# Patient Record
Sex: MALE | Race: BLACK OR AFRICAN AMERICAN | Employment: FULL TIME | URBAN - METROPOLITAN AREA
[De-identification: names, ages, dates, MRNs, and addresses within clinical notes are randomized per-mention and may not be internally consistent; named-entity substitution may affect disease eponyms.]

---

## 2017-10-18 ENCOUNTER — ED HISTORICAL/CONVERTED ENCOUNTER (OUTPATIENT)
Dept: OTHER | Age: 27
End: 2017-10-18

## 2017-10-19 ENCOUNTER — OP HISTORICAL/CONVERTED ENCOUNTER (OUTPATIENT)
Dept: OTHER | Age: 27
End: 2017-10-19

## 2017-10-21 ENCOUNTER — HOSPITAL ENCOUNTER (INPATIENT)
Age: 27
LOS: 1 days | Discharge: HOME | DRG: 638 | End: 2017-10-22
Attending: EMERGENCY MEDICINE | Admitting: INTERNAL MEDICINE
Payer: COMMERCIAL

## 2017-10-21 DIAGNOSIS — E10.10 DIABETIC KETOACIDOSIS WITHOUT COMA ASSOCIATED WITH TYPE 1 DIABETES MELLITUS (HCC): Primary | ICD-10-CM

## 2017-10-21 DIAGNOSIS — L02.413 CUTANEOUS ABSCESS OF RIGHT UPPER EXTREMITY: ICD-10-CM

## 2017-10-21 PROBLEM — E11.10 DKA (DIABETIC KETOACIDOSES): Status: ACTIVE | Noted: 2017-10-21

## 2017-10-21 LAB
ADMINISTERED INITIALS, ADMINIT: NORMAL
ALBUMIN SERPL-MCNC: 4.1 G/DL (ref 3.5–5)
ALBUMIN/GLOB SERPL: 1 {RATIO} (ref 1.1–2.2)
ALP SERPL-CCNC: 158 U/L (ref 45–117)
ALT SERPL-CCNC: 45 U/L (ref 12–78)
ANION GAP SERPL CALC-SCNC: 20 MMOL/L (ref 5–15)
ANION GAP SERPL CALC-SCNC: 21 MMOL/L (ref 5–15)
APPEARANCE UR: CLEAR
ARTERIAL PATENCY WRIST A: ABNORMAL
AST SERPL-CCNC: 33 U/L (ref 15–37)
BACTERIA URNS QL MICRO: NEGATIVE /HPF
BASE DEFICIT BLDV-SCNC: 9 MMOL/L
BASOPHILS # BLD: 0 K/UL (ref 0–0.1)
BASOPHILS NFR BLD: 0 % (ref 0–1)
BDY SITE: ABNORMAL
BILIRUB SERPL-MCNC: 1.4 MG/DL (ref 0.2–1)
BILIRUB UR QL: NEGATIVE
BUN SERPL-MCNC: 20 MG/DL (ref 6–20)
BUN SERPL-MCNC: 21 MG/DL (ref 6–20)
BUN/CREAT SERPL: 15 (ref 12–20)
BUN/CREAT SERPL: 16 (ref 12–20)
CALCIUM SERPL-MCNC: 9.4 MG/DL (ref 8.5–10.1)
CALCIUM SERPL-MCNC: 9.5 MG/DL (ref 8.5–10.1)
CHLORIDE SERPL-SCNC: 88 MMOL/L (ref 97–108)
CHLORIDE SERPL-SCNC: 89 MMOL/L (ref 97–108)
CO2 SERPL-SCNC: 13 MMOL/L (ref 21–32)
CO2 SERPL-SCNC: 15 MMOL/L (ref 21–32)
COLOR UR: ABNORMAL
CREAT SERPL-MCNC: 1.31 MG/DL (ref 0.7–1.3)
CREAT SERPL-MCNC: 1.32 MG/DL (ref 0.7–1.3)
D50 ADMINISTERED, D50ADM: 0 ML
D50 ORDER, D50ORD: 0 ML
EOSINOPHIL # BLD: 0.1 K/UL (ref 0–0.4)
EOSINOPHIL NFR BLD: 1 % (ref 0–7)
EPITH CASTS URNS QL MICRO: ABNORMAL /LPF
ERYTHROCYTE [DISTWIDTH] IN BLOOD BY AUTOMATED COUNT: 11.9 % (ref 11.5–14.5)
EST. AVERAGE GLUCOSE BLD GHB EST-MCNC: 269 MG/DL
GAS FLOW.O2 O2 DELIVERY SYS: ABNORMAL L/MIN
GLOBULIN SER CALC-MCNC: 4.1 G/DL (ref 2–4)
GLSCOM COMMENTS: NORMAL
GLUCOSE BLD STRIP.AUTO-MCNC: 238 MG/DL (ref 65–100)
GLUCOSE BLD STRIP.AUTO-MCNC: 330 MG/DL (ref 65–100)
GLUCOSE BLD STRIP.AUTO-MCNC: 409 MG/DL (ref 65–100)
GLUCOSE SERPL-MCNC: 513 MG/DL (ref 65–100)
GLUCOSE SERPL-MCNC: 521 MG/DL (ref 65–100)
GLUCOSE UR STRIP.AUTO-MCNC: >1000 MG/DL
GLUCOSE, GLC: 238 MG/DL
GLUCOSE, GLC: 330 MG/DL
GLUCOSE, GLC: 409 MG/DL
HBA1C MFR BLD: 11 % (ref 4.2–6.3)
HCO3 BLDV-SCNC: 16.4 MMOL/L (ref 23–28)
HCT VFR BLD AUTO: 43.7 % (ref 36.6–50.3)
HGB BLD-MCNC: 15.3 G/DL (ref 12.1–17)
HGB UR QL STRIP: NEGATIVE
HIGH TARGET, HITG: 250 MG/DL
HYALINE CASTS URNS QL MICRO: ABNORMAL /LPF (ref 0–5)
INSULIN ADMINSTERED, INSADM: 5.3 UNITS/HOUR
INSULIN ADMINSTERED, INSADM: 7 UNITS/HOUR
INSULIN ADMINSTERED, INSADM: 8.1 UNITS/HOUR
INSULIN ORDER, INSORD: 5.3 UNITS/HOUR
INSULIN ORDER, INSORD: 7 UNITS/HOUR
INSULIN ORDER, INSORD: 8.1 UNITS/HOUR
KETONES UR QL STRIP.AUTO: 80 MG/DL
LEUKOCYTE ESTERASE UR QL STRIP.AUTO: NEGATIVE
LOW TARGET, LOT: 150 MG/DL
LYMPHOCYTES # BLD: 1.6 K/UL (ref 0.8–3.5)
LYMPHOCYTES NFR BLD: 17 % (ref 12–49)
MCH RBC QN AUTO: 33.3 PG (ref 26–34)
MCHC RBC AUTO-ENTMCNC: 35 G/DL (ref 30–36.5)
MCV RBC AUTO: 95 FL (ref 80–99)
MINUTES UNTIL NEXT BG, NBG: 60 MIN
MONOCYTES # BLD: 0.9 K/UL (ref 0–1)
MONOCYTES NFR BLD: 10 % (ref 5–13)
MULTIPLIER, MUL: 0.02
MULTIPLIER, MUL: 0.03
MULTIPLIER, MUL: 0.03
NEUTS SEG # BLD: 6.6 K/UL (ref 1.8–8)
NEUTS SEG NFR BLD: 72 % (ref 32–75)
NITRITE UR QL STRIP.AUTO: NEGATIVE
ORDER INITIALS, ORDINIT: NORMAL
PCO2 BLDV: 29.6 MMHG (ref 41–51)
PH BLDV: 7.35 [PH] (ref 7.32–7.42)
PH UR STRIP: 5 [PH] (ref 5–8)
PHOSPHATE SERPL-MCNC: 5.6 MG/DL (ref 2.6–4.7)
PLATELET # BLD AUTO: 327 K/UL (ref 150–400)
PO2 BLDV: 55 MMHG (ref 25–40)
POTASSIUM SERPL-SCNC: 5.4 MMOL/L (ref 3.5–5.1)
POTASSIUM SERPL-SCNC: 5.5 MMOL/L (ref 3.5–5.1)
POTASSIUM SERPL-SCNC: 6 MMOL/L (ref 3.5–5.1)
PROT SERPL-MCNC: 8.2 G/DL (ref 6.4–8.2)
PROT UR STRIP-MCNC: NEGATIVE MG/DL
RBC # BLD AUTO: 4.6 M/UL (ref 4.1–5.7)
RBC #/AREA URNS HPF: ABNORMAL /HPF (ref 0–5)
SAO2 % BLDV: 87 % (ref 65–88)
SERVICE CMNT-IMP: ABNORMAL
SODIUM SERPL-SCNC: 123 MMOL/L (ref 136–145)
SODIUM SERPL-SCNC: 123 MMOL/L (ref 136–145)
SP GR UR REFRACTOMETRY: 1.03 (ref 1–1.03)
SPECIMEN TYPE: ABNORMAL
UA: UC IF INDICATED,UAUC: ABNORMAL
UROBILINOGEN UR QL STRIP.AUTO: 0.2 EU/DL (ref 0.2–1)
WBC # BLD AUTO: 9.2 K/UL (ref 4.1–11.1)
WBC URNS QL MICRO: ABNORMAL /HPF (ref 0–4)

## 2017-10-21 PROCEDURE — 75810000289 HC I&D ABSCESS SIMP/COMP/MULT

## 2017-10-21 PROCEDURE — 87077 CULTURE AEROBIC IDENTIFY: CPT | Performed by: EMERGENCY MEDICINE

## 2017-10-21 PROCEDURE — 85025 COMPLETE CBC W/AUTO DIFF WBC: CPT | Performed by: EMERGENCY MEDICINE

## 2017-10-21 PROCEDURE — 82803 BLOOD GASES ANY COMBINATION: CPT

## 2017-10-21 PROCEDURE — 82962 GLUCOSE BLOOD TEST: CPT

## 2017-10-21 PROCEDURE — 0H9DXZZ DRAINAGE OF RIGHT LOWER ARM SKIN, EXTERNAL APPROACH: ICD-10-PCS | Performed by: EMERGENCY MEDICINE

## 2017-10-21 PROCEDURE — 87205 SMEAR GRAM STAIN: CPT | Performed by: EMERGENCY MEDICINE

## 2017-10-21 PROCEDURE — 96360 HYDRATION IV INFUSION INIT: CPT

## 2017-10-21 PROCEDURE — 74011000258 HC RX REV CODE- 258: Performed by: EMERGENCY MEDICINE

## 2017-10-21 PROCEDURE — 81001 URINALYSIS AUTO W/SCOPE: CPT | Performed by: INTERNAL MEDICINE

## 2017-10-21 PROCEDURE — 83036 HEMOGLOBIN GLYCOSYLATED A1C: CPT | Performed by: EMERGENCY MEDICINE

## 2017-10-21 PROCEDURE — 74011250636 HC RX REV CODE- 250/636: Performed by: INTERNAL MEDICINE

## 2017-10-21 PROCEDURE — 36415 COLL VENOUS BLD VENIPUNCTURE: CPT | Performed by: EMERGENCY MEDICINE

## 2017-10-21 PROCEDURE — 84132 ASSAY OF SERUM POTASSIUM: CPT | Performed by: EMERGENCY MEDICINE

## 2017-10-21 PROCEDURE — 74011250637 HC RX REV CODE- 250/637: Performed by: INTERNAL MEDICINE

## 2017-10-21 PROCEDURE — 80048 BASIC METABOLIC PNL TOTAL CA: CPT | Performed by: EMERGENCY MEDICINE

## 2017-10-21 PROCEDURE — 74011000250 HC RX REV CODE- 250: Performed by: EMERGENCY MEDICINE

## 2017-10-21 PROCEDURE — 65660000001 HC RM ICU INTERMED STEPDOWN

## 2017-10-21 PROCEDURE — 84100 ASSAY OF PHOSPHORUS: CPT | Performed by: EMERGENCY MEDICINE

## 2017-10-21 PROCEDURE — 74011250636 HC RX REV CODE- 250/636: Performed by: EMERGENCY MEDICINE

## 2017-10-21 PROCEDURE — 99285 EMERGENCY DEPT VISIT HI MDM: CPT

## 2017-10-21 PROCEDURE — 87186 SC STD MICRODIL/AGAR DIL: CPT | Performed by: EMERGENCY MEDICINE

## 2017-10-21 PROCEDURE — 80053 COMPREHEN METABOLIC PANEL: CPT | Performed by: EMERGENCY MEDICINE

## 2017-10-21 PROCEDURE — 74011636637 HC RX REV CODE- 636/637: Performed by: EMERGENCY MEDICINE

## 2017-10-21 RX ORDER — INSULIN LISPRO 100 [IU]/ML
INJECTION, SOLUTION INTRAVENOUS; SUBCUTANEOUS
Status: DISCONTINUED | OUTPATIENT
Start: 2017-10-22 | End: 2017-10-22 | Stop reason: HOSPADM

## 2017-10-21 RX ORDER — ONDANSETRON 2 MG/ML
4 INJECTION INTRAMUSCULAR; INTRAVENOUS
Status: DISCONTINUED | OUTPATIENT
Start: 2017-10-21 | End: 2017-10-22 | Stop reason: HOSPADM

## 2017-10-21 RX ORDER — SODIUM CHLORIDE 0.9 % (FLUSH) 0.9 %
5-10 SYRINGE (ML) INJECTION AS NEEDED
Status: DISCONTINUED | OUTPATIENT
Start: 2017-10-21 | End: 2017-10-21 | Stop reason: SDUPTHER

## 2017-10-21 RX ORDER — BACITRACIN 500 UNIT/G
1 PACKET (EA) TOPICAL
Status: COMPLETED | OUTPATIENT
Start: 2017-10-21 | End: 2017-10-21

## 2017-10-21 RX ORDER — SODIUM CHLORIDE 9 MG/ML
150 INJECTION, SOLUTION INTRAVENOUS CONTINUOUS
Status: DISCONTINUED | OUTPATIENT
Start: 2017-10-21 | End: 2017-10-21 | Stop reason: SDUPTHER

## 2017-10-21 RX ORDER — MAGNESIUM SULFATE 100 %
4 CRYSTALS MISCELLANEOUS AS NEEDED
Status: DISCONTINUED | OUTPATIENT
Start: 2017-10-21 | End: 2017-10-21 | Stop reason: SDUPTHER

## 2017-10-21 RX ORDER — MAGNESIUM SULFATE 100 %
4 CRYSTALS MISCELLANEOUS AS NEEDED
Status: DISCONTINUED | OUTPATIENT
Start: 2017-10-21 | End: 2017-10-22 | Stop reason: HOSPADM

## 2017-10-21 RX ORDER — DEXTROSE 50 % IN WATER (D50W) INTRAVENOUS SYRINGE
12.5-25 AS NEEDED
Status: DISCONTINUED | OUTPATIENT
Start: 2017-10-21 | End: 2017-10-22 | Stop reason: HOSPADM

## 2017-10-21 RX ORDER — SODIUM CHLORIDE 0.9 % (FLUSH) 0.9 %
5-10 SYRINGE (ML) INJECTION AS NEEDED
Status: DISCONTINUED | OUTPATIENT
Start: 2017-10-21 | End: 2017-10-22 | Stop reason: HOSPADM

## 2017-10-21 RX ORDER — SODIUM CHLORIDE 9 MG/ML
150 INJECTION, SOLUTION INTRAVENOUS CONTINUOUS
Status: DISCONTINUED | OUTPATIENT
Start: 2017-10-21 | End: 2017-10-22

## 2017-10-21 RX ORDER — ACETAMINOPHEN 325 MG/1
650 TABLET ORAL
Status: DISCONTINUED | OUTPATIENT
Start: 2017-10-21 | End: 2017-10-22 | Stop reason: HOSPADM

## 2017-10-21 RX ORDER — SODIUM CHLORIDE 0.9 % (FLUSH) 0.9 %
5-10 SYRINGE (ML) INJECTION EVERY 8 HOURS
Status: DISCONTINUED | OUTPATIENT
Start: 2017-10-21 | End: 2017-10-21 | Stop reason: SDUPTHER

## 2017-10-21 RX ORDER — LIDOCAINE HYDROCHLORIDE AND EPINEPHRINE 10; 10 MG/ML; UG/ML
1.5 INJECTION, SOLUTION INFILTRATION; PERINEURAL ONCE
Status: COMPLETED | OUTPATIENT
Start: 2017-10-21 | End: 2017-10-21

## 2017-10-21 RX ORDER — CEPHALEXIN 500 MG/1
500 CAPSULE ORAL EVERY 6 HOURS
Status: DISCONTINUED | OUTPATIENT
Start: 2017-10-22 | End: 2017-10-22 | Stop reason: HOSPADM

## 2017-10-21 RX ORDER — DEXTROSE 50 % IN WATER (D50W) INTRAVENOUS SYRINGE
12.5-25 AS NEEDED
Status: DISCONTINUED | OUTPATIENT
Start: 2017-10-21 | End: 2017-10-21 | Stop reason: SDUPTHER

## 2017-10-21 RX ORDER — SODIUM CHLORIDE 0.9 % (FLUSH) 0.9 %
5-10 SYRINGE (ML) INJECTION EVERY 8 HOURS
Status: DISCONTINUED | OUTPATIENT
Start: 2017-10-21 | End: 2017-10-22 | Stop reason: HOSPADM

## 2017-10-21 RX ADMIN — SODIUM CHLORIDE 150 ML/HR: 900 INJECTION, SOLUTION INTRAVENOUS at 22:57

## 2017-10-21 RX ADMIN — LIDOCAINE HYDROCHLORIDE,EPINEPHRINE BITARTRATE 15 MG: 10; .01 INJECTION, SOLUTION INFILTRATION; PERINEURAL at 19:09

## 2017-10-21 RX ADMIN — SODIUM CHLORIDE 150 ML/HR: 900 INJECTION, SOLUTION INTRAVENOUS at 22:00

## 2017-10-21 RX ADMIN — Medication 10 ML: at 23:00

## 2017-10-21 RX ADMIN — Medication 10 ML: at 22:56

## 2017-10-21 RX ADMIN — SODIUM CHLORIDE 7 UNITS/HR: 900 INJECTION, SOLUTION INTRAVENOUS at 21:34

## 2017-10-21 RX ADMIN — SODIUM CHLORIDE 1000 ML: 900 INJECTION, SOLUTION INTRAVENOUS at 19:47

## 2017-10-21 RX ADMIN — BACITRACIN 1 PACKET: 500 OINTMENT TOPICAL at 19:09

## 2017-10-21 RX ADMIN — CEPHALEXIN 500 MG: 500 CAPSULE ORAL at 23:00

## 2017-10-21 RX ADMIN — ACETAMINOPHEN 650 MG: 325 TABLET ORAL at 22:56

## 2017-10-21 NOTE — IP AVS SNAPSHOT
[address] [place] [address] 
[phone] Patient: Malena Doe 
MRN: [id number] :1990 Current Discharge Medication List  
  
START taking these medications Dose & Instructions Dispensing Information Comments Morning Noon Evening Bedtime  
 cephALEXin 500 mg capsule Commonly known as:  Sharri Miranda Your last dose was: Your next dose is:    
   
   
 Dose:  500 mg Take 1 Cap by mouth every six (6) hours for 10 days. Quantity:  40 Cap Refills:  0 CONTINUE these medications which have NOT CHANGED Dose & Instructions Dispensing Information Comments Morning Noon Evening Bedtime  
  insulin pump Misc Commonly known as:  PATIENT SUPPLIED Your last dose was: Your next dose is:    
   
   
 by SubCUTAneous route as needed. Refills:  0 Where to Get Your Medications Information on where to get these meds will be given to you by the nurse or doctor. ! Ask your nurse or doctor about these medications  
  cephALEXin 500 mg capsule

## 2017-10-21 NOTE — ED TRIAGE NOTES
Triage Note: Patient reports being treated for cellulitis to the right wrist. Patient recently admitted to [facility]. Hx of diabetes.

## 2017-10-21 NOTE — ED PROVIDER NOTES
HPI Comments: 28 y.o. male with past medical history significant for DM who presents to the ED with chief complaint of skin problem. Patient reports swelling to his right hand and wrist with onset ~6 days ago. Patient reports his right wrist swelling became worse since onset; reports his right hand swelling has improved since onset. Patient reports attempting to drain the swollen area with a needle; reports being able to drain \"some pus\" from the site. Patient reports being seen in a free-standing ER for his symptoms ~6 days ago; reports being diagnosed with cellulitis and being discharged home with antibiotics (keflex/bactrim). Patient reports \"two to three\" episodes of vomiting ~2 days ago. Patient reports being seen at [facility] for his vomiting at that time; reports being admitted to the hospital over night, being treated with IV antibiotics, and being discharged home \"yesterday. \" Patient reports his X-Ray of the right wrist during his visits was \"normal, no fractures or anything like that. \" Patient reports a history of DM1; reports having an insulin pump with Humalog. Patient reports his blood glucose levels have been running high, \"around 300-500,\" recently. Patient denies a history of insulin pump failure. Patient denies a history of any surgeries. Patient reports being left-handed. Patient denies any IV drug use. Patient denies loss of appetite, fever, and other joint swelling. There are no other acute medical concerns at this time. Old Chart Review: Patient has no prior visits on file. Social Hx: Works at [employer]. PCP: No primary care provider on file. Note written by Chucky Stallworth, as dictated by Debra Hameed DO 6:37 PM     The history is provided by the patient. Past Medical History:   Diagnosis Date    Diabetes [facility])        History reviewed. No pertinent surgical history. History reviewed. No pertinent family history.     Social History     Social History    Marital status: N/A     Spouse name: N/A    Number of children: N/A    Years of education: N/A     Occupational History    Not on file. Social History Main Topics    Smoking status: Current Some Day Smoker    Smokeless tobacco: Never Used    Alcohol use Yes      Comment: 2 x month    Drug use: No    Sexual activity: Not on file     Other Topics Concern    Not on file     Social History Narrative    No narrative on file         ALLERGIES: Review of patient's allergies indicates no known allergies. Review of Systems   Constitutional: Negative for appetite change and fever. Musculoskeletal: Positive for joint swelling. All other systems reviewed and are negative. Vitals:    10/21/17 1812   BP: 126/72   Pulse: 99   Resp: 16   Temp: 98.5 °F (36.9 °C)   SpO2: 100%   Weight: 67.8 kg (149 lb 6 oz)   Height: 5' 7\" (1.702 m)            Physical Exam      Constitutional: Pt is awake and alert. Pt appears well-developed and well-nourished. NAD. HENT:   Head: Normocephalic and atraumatic. Nose: Nose normal.   Mouth/Throat: Oropharynx is clear and moist. No oropharyngeal exudate. Eyes: Conjunctivae and extraocular motions are normal. Pupils are equal, round, and reactive to light. Right eye exhibits no discharge. Left eye exhibits no discharge. No scleral icterus. Neck: No tracheal deviation present. Supple neck. Cardiovascular: Normal rate, regular rhythm, normal heart sounds and intact distal pulses. Exam reveals no gallop and no friction rub. No murmur heard. Pulmonary/Chest: Effort normal and breath sounds normal.  Pt  has no wheezes. Pt  has no rales. Abdominal: Soft. Pt  exhibits no distension and no mass. No tenderness. Pt  has no rebound and no guarding. Musculoskeletal:  Pt  exhibits no edema and no tenderness. Ext: Normal ROM in all four extremities; not tender to palpation; distal pulses are normal, no edema. Neurological:  Pt is alert.   nonfocal neuro exam.  Skin: Area of mild swelling by the right wrist; may be an abscess, not hot to the touch; no adenopathy around the right elbow. Psychiatric:  Pt  has a normal mood and affect. Behavior is normal.   Note written by Chucky Sheldon, as dictated by Lemuel Balderas DO 6:46 PM    MDM  Number of Diagnoses or Management Options  Critical Care  Total time providing critical care: 30-74 minutes        35 minutes, exclusive of I&D. ED Course       I&D Abcess Complex  Date/Time: 10/21/2017 7:35 PM  Performed by: Oscar Padilla  Authorized by: Oscar Padilla     Consent:     Consent obtained:  Verbal    Consent given by:  Patient  Location:     Type:  Abscess    Size:  2-3 cm     Location:  Upper extremity    Upper extremity location:  Wrist    Wrist location:  R wrist  Pre-procedure details:     Skin preparation:  Betadine  Anesthesia (see MAR for exact dosages): Anesthesia method:  Local infiltration    Local anesthetic:  Lidocaine 1% WITH epi  Procedure type:     Complexity:  Complex  Procedure details:     Incision types:  Stab incision and single straight    Incision depth:  Subcutaneous    Scalpel blade:  11    Wound management:  Probed and deloculated    Drainage:  Bloody and purulent    Drainage amount: Moderate    Wound treatment:  Wound left open    Packing materials:  1/4 in gauze  Post-procedure details:     Patient tolerance of procedure: Tolerated well, no immediate complications        9:70 PM- - I&D is finished. BMP results are strange. He does not look that sick. CMP ordered. New blood. Venous pH is 7.35 as well. No vomiting here. No tachypnea/Kussmaul respirations. 8:29 PM - CMP noted. Mild DKA - looks well. Needs at least one night in hospital on an insulin drip to clear dka. Likely DC tomorrow. I think he had a pump malfunction - could be new insulin bottle (high glucoses since new set placed with this insulin), kinked tubing or pump malfunction intrinsically.   Probably the tubing or the new insulin bottle. CONSULT NOTE:  8:36 PM Linden Moser DO spoke with Dr. Jaki Joe DO, Consult for Hospitalist. Discussed available diagnostic tests and clinical findings. Provider is in agreement with care plans as outlined. Dr. Jaki Joe DO will see and admit the patient. Wound is improved  Drained small amount of pus and sent for cx. Abscess organized with abx over past week.     Labs reviewed

## 2017-10-21 NOTE — IP AVS SNAPSHOT
[address] 
[phone] Patient: Patricia France 
MRN: [id number] :1990 You are allergic to the following No active allergies Recent Documentation Height Weight BMI Smoking Status 1.702 m 67 kg 23.13 kg/m2 Current Some Day Smoker Unresulted Labs Order Current Status CULTURE, BLOOD, PAIRED In process CULTURE, WOUND W GRAM STAIN Preliminary result Emergency Contacts Name Discharge Info Relation Home Work Mobile [address] [facility] CAREGIVER [3] Mother [14] [phone] About your hospitalization You were admitted on:  2017 You last received care in the:  [facility] 4 IMCU 2 You were discharged on:  2017 Unit phone number:  [phone] Why you were hospitalized Your primary diagnosis was:  Not on File Your diagnoses also included:  Dka (Diabetic Ketoacidoses) (Hcc), Type 1 Diabetes Mellitus With Hyperglycemia (Hcc) Providers Seen During Your Hospitalizations Provider Role Specialty Primary office phone Roxanne Buenrostro DO Attending Provider Emergency Medicine [phone] Lizy Leigh DO Attending Provider Hospitalist [phone] Sergey Ayoub MD Attending Provider Internal Medicine [phone] Your Primary Care Physician (PCP) Primary Care Physician Office Phone Office Fax Abdoulaye Choco [phone] Follow-up Information Follow up With Details Comments Contact Info None  please call new PCP and get appt this week None [phone]) Patient stated that they have no PCP Donovan Louie MD Schedule an appointment as soon as possible for a visit in 1 week post DKA fu/ DM management [address] [place] [address] 72 [phone] Nallely Blanca MD   [address] [id number] [phone] Current Discharge Medication List  
  
START taking these medications Dose & Instructions Dispensing Information Comments Morning Noon Evening Bedtime  
 cephALEXin 500 mg capsule Commonly known as:  Otto Samano Your last dose was: Your next dose is:    
   
   
 Dose:  500 mg Take 1 Cap by mouth every six (6) hours for 10 days. Quantity:  40 Cap Refills:  0 CONTINUE these medications which have NOT CHANGED Dose & Instructions Dispensing Information Comments Morning Noon Evening Bedtime  
  insulin pump Misc Commonly known as:  PATIENT SUPPLIED Your last dose was: Your next dose is:    
   
   
 by SubCUTAneous route as needed. Refills:  0 Where to Get Your Medications Information on where to get these meds will be given to you by the nurse or doctor. ! Ask your nurse or doctor about these medications  
  cephALEXin 500 mg capsule Discharge Instructions Discharge Instructions PATIENT ID: Keyanna Costa 
MRN: [id number] YOB: 1990 DATE OF ADMISSION: 10/21/2017  6:14 PM   
DATE OF DISCHARGE: 10/22/2017 PRIMARY CARE PROVIDER: None DISCHARGING PHYSICIAN: Migdalia Helms NP To contact this individual call [phone] and ask the  to page. If unavailable ask to be transferred the Adult Hospitalist Department. DISCHARGE DIAGNOSES DKA, R wrist infection CONSULTATIONS: IP CONSULT TO HOSPITALIST 
 
PROCEDURES/SURGERIES: * No surgery found * PENDING TEST RESULTS:  
At the time of discharge the following test results are still pending: final wound culture FOLLOW UP APPOINTMENTS:  
Follow-up Information Follow up With Details Comments Contact Info  None  please call new PCP and get appt this week None [phone]) Patient stated that they have no PCP 
  
 Leigha Ramirez MD Schedule an appointment as soon as possible for a visit in 1 week post DKA fu/ DM management [address] [place] [address] 72 [phone] ADDITIONAL CARE RECOMMENDATIONS: Please call your PCP in AM and try to get in this week for follow up of wrist wound. You will need to see Dr. Dannielle Myers for pump adjustment as your Hgb A1C was 11. Finish Keflex, antibiotic in full. DIET: low carb, low sugar, avoid Sweet tea and pastas, breads ACTIVITY: resume WOUND CARE: change dressing daily on right wrist, wash with antibacterial soap EQUIPMENT needed: resume insulin pump DISCHARGE MEDICATIONS: 
 See Medication Reconciliation Form · It is important that you take the medication exactly as they are prescribed. · Keep your medication in the bottles provided by the pharmacist and keep a list of the medication names, dosages, and times to be taken in your wallet. · Do not take other medications without consulting your doctor. NOTIFY YOUR PHYSICIAN FOR ANY OF THE FOLLOWING:  
Fever over 101 degrees for 24 hours. Chest pain, shortness of breath, fever, chills, nausea, vomiting, diarrhea, change in mentation, falling, weakness, bleeding. Severe pain or pain not relieved by medications. Or, any other signs or symptoms that you may have questions about. DISPOSITION: 
  Home With: 
 OT  PT  HH  RN  
  
 SNF/Inpatient Rehab/LTAC  
x Independent/assisted living Hospice Other: CDMP Checked:  
Yes x Signed:  
Migdalia Helms NP 
10/22/2017 
10:00 AM 
 
Discharge Orders None [facility] Announcement We are excited to announce that we are making your provider's discharge notes available to you in [email or web address]. You will see these notes when they are completed and signed by the physician that discharged you from your recent hospital stay.   If you have any questions or concerns about any information you see in eBuilder, please call the Health Information Department where you were seen or reach out to your Primary Care Provider for more information about your plan of care. Introducing [facility]! [facility] introduces eBuilder patient portal. Now you can access parts of your medical record, email your doctor's office, and request medication refills online. 1. In your internet browser, go to [email or web address] 2. Click on the First Time User? Click Here link in the Sign In box. You will see the New Member Sign Up page. 3. Enter your eBuilder Access Code exactly as it appears below. You will not need to use this code after youve completed the sign-up process. If you do not sign up before the expiration date, you must request a new code. · eBuilder Access Code: [email or web address] Expires: 1/20/2018 10:36 AM 
 
4. Enter the last four digits of your Social Security Number (xxxx) and Date of Birth (mm/dd/yyyy) as indicated and click Submit. You will be taken to the next sign-up page. 5. Create a eBuilder ID. This will be your eBuilder login ID and cannot be changed, so think of one that is secure and easy to remember. 6. Create a eBuilder password. You can change your password at any time. 7. Enter your Password Reset Question and Answer. This can be used at a later time if you forget your password. 8. Enter your e-mail address. You will receive e-mail notification when new information is available in [address] 9. Click Sign Up. You can now view and download portions of your medical record. 10. Click the Download Summary menu link to download a portable copy of your medical information. If you have questions, please visit the Frequently Asked Questions section of the eBuilder website. Remember, eBuilder is NOT to be used for urgent needs. For medical emergencies, dial 911. Now available from your iPhone and Android! General Information Please provide this summary of care documentation to your next provider. Patient Signature:  ____________________________________________________________ Date:  ____________________________________________________________  
  
Miryam Dejesus Provider Signature:  ____________________________________________________________ Date:  ____________________________________________________________

## 2017-10-22 VITALS
TEMPERATURE: 98.6 F | SYSTOLIC BLOOD PRESSURE: 142 MMHG | HEIGHT: 67 IN | OXYGEN SATURATION: 100 % | WEIGHT: 147.71 LBS | BODY MASS INDEX: 23.18 KG/M2 | DIASTOLIC BLOOD PRESSURE: 72 MMHG | HEART RATE: 62 BPM | RESPIRATION RATE: 16 BRPM

## 2017-10-22 PROBLEM — E10.65 TYPE 1 DIABETES MELLITUS WITH HYPERGLYCEMIA (HCC): Status: ACTIVE | Noted: 2017-10-22

## 2017-10-22 LAB
ADMINISTERED INITIALS, ADMINIT: NORMAL
ANION GAP SERPL CALC-SCNC: 14 MMOL/L (ref 5–15)
ANION GAP SERPL CALC-SCNC: 9 MMOL/L (ref 5–15)
BASOPHILS # BLD: 0 K/UL (ref 0–0.1)
BASOPHILS # BLD: 0 K/UL (ref 0–0.1)
BASOPHILS NFR BLD: 0 % (ref 0–1)
BASOPHILS NFR BLD: 0 % (ref 0–1)
BUN SERPL-MCNC: 11 MG/DL (ref 6–20)
BUN SERPL-MCNC: 16 MG/DL (ref 6–20)
BUN/CREAT SERPL: 12 (ref 12–20)
BUN/CREAT SERPL: 15 (ref 12–20)
CALCIUM SERPL-MCNC: 8.5 MG/DL (ref 8.5–10.1)
CALCIUM SERPL-MCNC: 8.6 MG/DL (ref 8.5–10.1)
CHLORIDE SERPL-SCNC: 102 MMOL/L (ref 97–108)
CHLORIDE SERPL-SCNC: 98 MMOL/L (ref 97–108)
CO2 SERPL-SCNC: 18 MMOL/L (ref 21–32)
CO2 SERPL-SCNC: 22 MMOL/L (ref 21–32)
CREAT SERPL-MCNC: 0.93 MG/DL (ref 0.7–1.3)
CREAT SERPL-MCNC: 1.09 MG/DL (ref 0.7–1.3)
D50 ADMINISTERED, D50ADM: 0 ML
D50 ADMINISTERED, D50ADM: NORMAL ML
D50 ORDER, D50ORD: 0 ML
D50 ORDER, D50ORD: NORMAL ML
EOSINOPHIL # BLD: 0 K/UL (ref 0–0.4)
EOSINOPHIL # BLD: 0.1 K/UL (ref 0–0.4)
EOSINOPHIL NFR BLD: 0 % (ref 0–7)
EOSINOPHIL NFR BLD: 1 % (ref 0–7)
ERYTHROCYTE [DISTWIDTH] IN BLOOD BY AUTOMATED COUNT: 11.7 % (ref 11.5–14.5)
ERYTHROCYTE [DISTWIDTH] IN BLOOD BY AUTOMATED COUNT: 11.7 % (ref 11.5–14.5)
EST. AVERAGE GLUCOSE BLD GHB EST-MCNC: 269 MG/DL
GLSCOM COMMENTS: NORMAL
GLUCOSE BLD STRIP.AUTO-MCNC: 127 MG/DL (ref 65–100)
GLUCOSE BLD STRIP.AUTO-MCNC: 129 MG/DL (ref 65–100)
GLUCOSE BLD STRIP.AUTO-MCNC: 144 MG/DL (ref 65–100)
GLUCOSE BLD STRIP.AUTO-MCNC: 153 MG/DL (ref 65–100)
GLUCOSE BLD STRIP.AUTO-MCNC: 157 MG/DL (ref 65–100)
GLUCOSE BLD STRIP.AUTO-MCNC: 190 MG/DL (ref 65–100)
GLUCOSE BLD STRIP.AUTO-MCNC: 208 MG/DL (ref 65–100)
GLUCOSE BLD STRIP.AUTO-MCNC: 209 MG/DL (ref 65–100)
GLUCOSE BLD STRIP.AUTO-MCNC: 225 MG/DL (ref 65–100)
GLUCOSE BLD STRIP.AUTO-MCNC: 240 MG/DL (ref 65–100)
GLUCOSE BLD STRIP.AUTO-MCNC: 276 MG/DL (ref 65–100)
GLUCOSE BLD STRIP.AUTO-MCNC: 300 MG/DL (ref 65–100)
GLUCOSE SERPL-MCNC: 172 MG/DL (ref 65–100)
GLUCOSE SERPL-MCNC: 243 MG/DL (ref 65–100)
GLUCOSE, GLC: 127 MG/DL
GLUCOSE, GLC: 129 MG/DL
GLUCOSE, GLC: 144 MG/DL
GLUCOSE, GLC: 153 MG/DL
GLUCOSE, GLC: 157 MG/DL
GLUCOSE, GLC: 190 MG/DL
GLUCOSE, GLC: 209 MG/DL
GLUCOSE, GLC: 225 MG/DL
GLUCOSE, GLC: 240 MG/DL
GLUCOSE, GLC: 276 MG/DL
GLUCOSE, GLC: 300 MG/DL
GLUCOSE, GLC: NORMAL MG/DL
HBA1C MFR BLD: 11 % (ref 4.2–6.3)
HCT VFR BLD AUTO: 39.5 % (ref 36.6–50.3)
HCT VFR BLD AUTO: 40.4 % (ref 36.6–50.3)
HGB BLD-MCNC: 14 G/DL (ref 12.1–17)
HGB BLD-MCNC: 14.1 G/DL (ref 12.1–17)
HIGH TARGET, HITG: 250 MG/DL
HIGH TARGET, HITG: NORMAL MG/DL
INSULIN ADMINSTERED, INSADM: 1.3 UNITS/HOUR
INSULIN ADMINSTERED, INSADM: 1.9 UNITS/HOUR
INSULIN ADMINSTERED, INSADM: 1.9 UNITS/HOUR
INSULIN ADMINSTERED, INSADM: 12 UNITS/HOUR
INSULIN ADMINSTERED, INSADM: 2.1 UNITS/HOUR
INSULIN ADMINSTERED, INSADM: 3 UNITS/HOUR
INSULIN ADMINSTERED, INSADM: 3.4 UNITS/HOUR
INSULIN ADMINSTERED, INSADM: 6.5 UNITS/HOUR
INSULIN ADMINSTERED, INSADM: 8.3 UNITS/HOUR
INSULIN ADMINSTERED, INSADM: 8.6 UNITS/HOUR
INSULIN ADMINSTERED, INSADM: 9 UNITS/HOUR
INSULIN ADMINSTERED, INSADM: NORMAL UNITS/HOUR
INSULIN ORDER, INSORD: 1.3 UNITS/HOUR
INSULIN ORDER, INSORD: 1.9 UNITS/HOUR
INSULIN ORDER, INSORD: 1.9 UNITS/HOUR
INSULIN ORDER, INSORD: 12 UNITS/HOUR
INSULIN ORDER, INSORD: 2.1 UNITS/HOUR
INSULIN ORDER, INSORD: 3 UNITS/HOUR
INSULIN ORDER, INSORD: 3.4 UNITS/HOUR
INSULIN ORDER, INSORD: 6.5 UNITS/HOUR
INSULIN ORDER, INSORD: 8.3 UNITS/HOUR
INSULIN ORDER, INSORD: 8.6 UNITS/HOUR
INSULIN ORDER, INSORD: 9 UNITS/HOUR
INSULIN ORDER, INSORD: NORMAL UNITS/HOUR
LACTATE SERPL-SCNC: 1.6 MMOL/L (ref 0.4–2)
LOW TARGET, LOT: 150 MG/DL
LOW TARGET, LOT: NORMAL MG/DL
LYMPHOCYTES # BLD: 2.1 K/UL (ref 0.8–3.5)
LYMPHOCYTES # BLD: 3.4 K/UL (ref 0.8–3.5)
LYMPHOCYTES NFR BLD: 17 % (ref 12–49)
LYMPHOCYTES NFR BLD: 30 % (ref 12–49)
MAGNESIUM SERPL-MCNC: 1.8 MG/DL (ref 1.6–2.4)
MAGNESIUM SERPL-MCNC: 1.8 MG/DL (ref 1.6–2.4)
MCH RBC QN AUTO: 32.3 PG (ref 26–34)
MCH RBC QN AUTO: 32.6 PG (ref 26–34)
MCHC RBC AUTO-ENTMCNC: 34.7 G/DL (ref 30–36.5)
MCHC RBC AUTO-ENTMCNC: 35.7 G/DL (ref 30–36.5)
MCV RBC AUTO: 91.2 FL (ref 80–99)
MCV RBC AUTO: 93.1 FL (ref 80–99)
MINUTES UNTIL NEXT BG, NBG: 60 MIN
MINUTES UNTIL NEXT BG, NBG: NORMAL MIN
MONOCYTES # BLD: 0.7 K/UL (ref 0–1)
MONOCYTES # BLD: 1.2 K/UL (ref 0–1)
MONOCYTES NFR BLD: 10 % (ref 5–13)
MONOCYTES NFR BLD: 6 % (ref 5–13)
MULTIPLIER, MUL: 0.02
MULTIPLIER, MUL: 0.03
MULTIPLIER, MUL: 0.04
MULTIPLIER, MUL: 0.04
MULTIPLIER, MUL: 0.05
MULTIPLIER, MUL: NORMAL
NEUTS SEG # BLD: 6.7 K/UL (ref 1.8–8)
NEUTS SEG # BLD: 9.1 K/UL (ref 1.8–8)
NEUTS SEG NFR BLD: 59 % (ref 32–75)
NEUTS SEG NFR BLD: 77 % (ref 32–75)
ORDER INITIALS, ORDINIT: NORMAL
PHOSPHATE SERPL-MCNC: 3.6 MG/DL (ref 2.6–4.7)
PLATELET # BLD AUTO: 318 K/UL (ref 150–400)
PLATELET # BLD AUTO: 338 K/UL (ref 150–400)
POTASSIUM SERPL-SCNC: 3.6 MMOL/L (ref 3.5–5.1)
POTASSIUM SERPL-SCNC: 4.2 MMOL/L (ref 3.5–5.1)
RBC # BLD AUTO: 4.33 M/UL (ref 4.1–5.7)
RBC # BLD AUTO: 4.34 M/UL (ref 4.1–5.7)
SERVICE CMNT-IMP: ABNORMAL
SODIUM SERPL-SCNC: 130 MMOL/L (ref 136–145)
SODIUM SERPL-SCNC: 133 MMOL/L (ref 136–145)
WBC # BLD AUTO: 11.4 K/UL (ref 4.1–11.1)
WBC # BLD AUTO: 11.9 K/UL (ref 4.1–11.1)

## 2017-10-22 PROCEDURE — 36415 COLL VENOUS BLD VENIPUNCTURE: CPT | Performed by: INTERNAL MEDICINE

## 2017-10-22 PROCEDURE — 87040 BLOOD CULTURE FOR BACTERIA: CPT | Performed by: INTERNAL MEDICINE

## 2017-10-22 PROCEDURE — 83036 HEMOGLOBIN GLYCOSYLATED A1C: CPT | Performed by: INTERNAL MEDICINE

## 2017-10-22 PROCEDURE — 83605 ASSAY OF LACTIC ACID: CPT | Performed by: INTERNAL MEDICINE

## 2017-10-22 PROCEDURE — 84100 ASSAY OF PHOSPHORUS: CPT | Performed by: INTERNAL MEDICINE

## 2017-10-22 PROCEDURE — 80048 BASIC METABOLIC PNL TOTAL CA: CPT | Performed by: INTERNAL MEDICINE

## 2017-10-22 PROCEDURE — 82962 GLUCOSE BLOOD TEST: CPT

## 2017-10-22 PROCEDURE — 74011000258 HC RX REV CODE- 258: Performed by: INTERNAL MEDICINE

## 2017-10-22 PROCEDURE — 74011636637 HC RX REV CODE- 636/637: Performed by: NURSE PRACTITIONER

## 2017-10-22 PROCEDURE — 83735 ASSAY OF MAGNESIUM: CPT | Performed by: INTERNAL MEDICINE

## 2017-10-22 PROCEDURE — 74011636637 HC RX REV CODE- 636/637: Performed by: INTERNAL MEDICINE

## 2017-10-22 PROCEDURE — 74011250637 HC RX REV CODE- 250/637: Performed by: INTERNAL MEDICINE

## 2017-10-22 PROCEDURE — 85025 COMPLETE CBC W/AUTO DIFF WBC: CPT | Performed by: EMERGENCY MEDICINE

## 2017-10-22 RX ORDER — INSULIN GLARGINE 100 [IU]/ML
10 INJECTION, SOLUTION SUBCUTANEOUS ONCE
Status: COMPLETED | OUTPATIENT
Start: 2017-10-22 | End: 2017-10-22

## 2017-10-22 RX ORDER — DEXTROSE MONOHYDRATE AND SODIUM CHLORIDE 5; .9 G/100ML; G/100ML
150 INJECTION, SOLUTION INTRAVENOUS CONTINUOUS
Status: DISCONTINUED | OUTPATIENT
Start: 2017-10-22 | End: 2017-10-22

## 2017-10-22 RX ORDER — CEPHALEXIN 500 MG/1
500 CAPSULE ORAL EVERY 6 HOURS
Qty: 40 CAP | Refills: 0 | Status: SHIPPED | OUTPATIENT
Start: 2017-10-22 | End: 2017-11-01

## 2017-10-22 RX ORDER — CEPHALEXIN 500 MG/1
500 CAPSULE ORAL EVERY 6 HOURS
Qty: 40 CAP | Refills: 0 | Status: SHIPPED | OUTPATIENT
Start: 2017-10-22 | End: 2017-10-22

## 2017-10-22 RX ADMIN — DEXTROSE MONOHYDRATE AND SODIUM CHLORIDE 150 ML/HR: 5; .9 INJECTION, SOLUTION INTRAVENOUS at 08:11

## 2017-10-22 RX ADMIN — CEPHALEXIN 500 MG: 500 CAPSULE ORAL at 11:14

## 2017-10-22 RX ADMIN — INSULIN GLARGINE 10 UNITS: 100 INJECTION, SOLUTION SUBCUTANEOUS at 10:34

## 2017-10-22 RX ADMIN — CEPHALEXIN 500 MG: 500 CAPSULE ORAL at 06:13

## 2017-10-22 RX ADMIN — DEXTROSE MONOHYDRATE AND SODIUM CHLORIDE 150 ML/HR: 5; .9 INJECTION, SOLUTION INTRAVENOUS at 00:37

## 2017-10-22 RX ADMIN — Medication 10 ML: at 06:13

## 2017-10-22 RX ADMIN — Medication 10 ML: at 14:00

## 2017-10-22 NOTE — PROGRESS NOTES
Problem: DKA: Day 2  Goal: Diagnostic Test/Procedures  Outcome: Progressing Towards Goal  Patients chemistry WDL, will reassess as needed.

## 2017-10-22 NOTE — ROUTINE PROCESS
TRANSFER - OUT REPORT:    Verbal report given to Юлия Renae RN (name) on Mary Flood  being transferred to [facility] (unit) for routine progression of care       Report consisted of patients Situation, Background, Assessment and   Recommendations(SBAR). Information from the following report(s) SBAR, ED Summary, Intake/Output, MAR and Recent Results was reviewed with the receiving nurse. Lines:   Peripheral IV 10/21/17 Left Antecubital (Active)   Site Assessment Clean, dry, & intact 10/21/2017  6:46 PM   Phlebitis Assessment 0 10/21/2017  6:46 PM   Infiltration Assessment 0 10/21/2017  6:46 PM   Dressing Status Clean, dry, & intact 10/21/2017  6:46 PM   Dressing Type Transparent 10/21/2017  6:46 PM   Hub Color/Line Status Pink;Flushed;Patent 10/21/2017  6:46 PM   Action Taken Blood drawn 10/21/2017  6:46 PM       Peripheral IV 10/21/17 Left; Lower Forearm (Active)   Site Assessment Clean, dry, & intact 10/21/2017 10:06 PM   Phlebitis Assessment 0 10/21/2017 10:06 PM   Infiltration Assessment 0 10/21/2017 10:06 PM   Dressing Status Clean, dry, & intact 10/21/2017 10:06 PM   Dressing Type Transparent 10/21/2017 10:06 PM   Hub Color/Line Status Pink;Flushed;Patent 10/21/2017 10:06 PM   Action Taken Blood drawn 10/21/2017 10:06 PM        Opportunity for questions and clarification was provided. Patient transported with:   Registered Nurse     UPDATE: Pt assessment unchanged, VSS, waiting on transport to inpatient unit.      Visit Vitals    /67 (BP 1 Location: Right arm, BP Patient Position: At rest)    Pulse 95    Temp 98.1 °F (36.7 °C)    Resp 16    Ht 5' 7\" (1.702 m)    Wt 67.8 kg (149 lb 6 oz)    SpO2 98%    BMI 23.4 kg/m2

## 2017-10-22 NOTE — DISCHARGE INSTRUCTIONS
Discharge Instructions       PATIENT ID: Ariela Hernandez  MRN: [id number]   YOB: 1990    DATE OF ADMISSION: 10/21/2017  6:14 PM    DATE OF DISCHARGE: 10/22/2017    PRIMARY CARE PROVIDER: None       DISCHARGING PHYSICIAN: Sheldon Faustin NP    To contact this individual call [phone] and ask the  to page. If unavailable ask to be transferred the Adult Hospitalist Department. DISCHARGE DIAGNOSES DKA, R wrist infection    CONSULTATIONS: IP CONSULT TO HOSPITALIST    PROCEDURES/SURGERIES: * No surgery found *    PENDING TEST RESULTS:   At the time of discharge the following test results are still pending: final wound culture    FOLLOW UP APPOINTMENTS:   Follow-up Information     Follow up With Details Comments Contact Info    None  please call new PCP and get appt this week None ([phone]) Patient stated that they have no PCP      Feliciano Pendleton MD Schedule an appointment as soon as possible for a visit in 1 week post DKA fu/ DM management [address] [address]  [phone]             ADDITIONAL CARE RECOMMENDATIONS: Please call your PCP in AM and try to get in this week for follow up of wrist wound. You will need to see Dr. Mensah Form for pump adjustment as your Hgb A1C was 11. Finish Keflex, antibiotic in full. DIET: low carb, low sugar, avoid Sweet tea and pastas, breads    ACTIVITY: resume    WOUND CARE: change dressing daily on right wrist, wash with antibacterial soap    EQUIPMENT needed: resume insulin pump      DISCHARGE MEDICATIONS:   See Medication Reconciliation Form    · It is important that you take the medication exactly as they are prescribed. · Keep your medication in the bottles provided by the pharmacist and keep a list of the medication names, dosages, and times to be taken in your wallet. · Do not take other medications without consulting your doctor.        NOTIFY YOUR PHYSICIAN FOR ANY OF THE FOLLOWING:   Fever over 101 degrees for 24 hours.   Chest pain, shortness of breath, fever, chills, nausea, vomiting, diarrhea, change in mentation, falling, weakness, bleeding. Severe pain or pain not relieved by medications. Or, any other signs or symptoms that you may have questions about.       DISPOSITION:    Home With:   OT  PT  HH  RN       SNF/Inpatient Rehab/LTAC   x Independent/assisted living    Hospice    Other:     CDMP Checked:   Yes x       Signed:   Bernardo King NP  10/22/2017  10:00 AM

## 2017-10-22 NOTE — PROGRESS NOTES
1019  TRANSFER - IN REPORT:    Verbal report received from [facility], [facility] (name) on Robin Thompson  being received from ED (unit) for routine progression of care      Report consisted of patients Situation, Background, Assessment and   Recommendations(SBAR). Information from the following report(s) SBAR, Kardex, ED Summary, Intake/Output, MAR, Recent Results and Cardiac Rhythm NSR  was reviewed with the receiving nurse. Opportunity for questions and clarification was provided. Assessment completed upon patients arrival to unit and care assumed. [address] Primary Nurse Bc Ma and Magalie RN performed a dual skin assessment on this patient right wrist abscess incision and drainage site with gauze and Darling wrap. John score is 23.

## 2017-10-22 NOTE — PROGRESS NOTES
Hospitalist Progress Note            Daily Progress Note: 10/22/2017    I have seen and examined the patient. Case discussed with NP, please see her more detailed note   I agree with the overall treatment plan as documented. DKA now resolved, can come off insulin drip. SUDHA now resolved    Hyperkalemia now resolved    Hyponatremia now improved    Cellulitis wrist on keflex wound culture [facility], ? outpatient follow up    Discharge ?today        Hamzah Snow MD      Hospitalist, Internal Medicine            Blackberry number:  [phone]

## 2017-10-22 NOTE — H&P
[address] [facility] [address]   HISTORY AND PHYSICAL       Name:  Xavier Khoury   MR#:  [id number]   :  1990   Account #:  [de-identified]        Date of Adm:  10/21/2017       PRIMARY CARE PHYSICIAN: Unknown. The patient does not have a   PCP. CHIEF COMPLAINT: High blood sugar. HISTORY OF PRESENT ILLNESS: This is a 18-year-old [place]   American male who has had type 1 diabetes mellitus since the age of   14 and uses an insulin pump. He states that several days ago, he   noted that he had a wound on his right wrist. He noted that it was   increasing and appeared to have an abscess, so he popped this   abscess himself, releasing some pus. It continued to worsen. He went   to an urgent care and was given Bactrim and Keflex. He felt that this   made him sick as he was vomiting and his wound continued to worsen. He then went to a hospital on the [facility]. He was admitted on   Thursday and given IV antibiotics. He was then discharged Friday   morning and states that he was not given any antibiotics to go home   with and he was afraid to take the oral antibiotics that he had leftover   secondary to vomiting. He states he felt that they were too high of a   dose for him. He then noted that his blood sugar has been increasing   steadily over the last 24 hours and he came to the emergency room for   further evaluation. In the emergency room, he was found to be in DKA   and he has been started on an insulin drip in the ICU with IV fluids and   will be admitted for further evaluation and treatment. PAST MEDICAL HISTORY: Significant for type 1 diabetes mellitus. PAST SURGICAL HISTORY: He has had no surgeries. FAMILY HISTORY: His father has hypertension and grandmother who   had diabetes. SOCIAL HISTORY: He denies any illicit drug use, but he admits to   occasional alcohol and tobacco abuse. ALLERGIES: HE HAS NO KNOWN DRUG ALLERGIES.     MEDICATIONS: Include his insulin pump only. REVIEW OF SYSTEMS   HEENT: He denies having any headache, dizziness, lightheadedness,   change in vision, change in hearing, tinnitus, rhinorrhea or cough. RESPIRATORY: Denies any shortness of breath, dyspnea on exertion. CARDIOVASCULAR: Denies any chest pain or palpitations. GASTROINTESTINAL: Denies any nausea, vomiting, constipation,   diarrhea. GENITOURINARY: Denies any dysuria or hematuria. MUSCULOSKELETAL: Denies any muscle pain or joint aches. SKIN: Denies having any itching or rash. PSYCHIATRIC: Denies having any issues of anxiety or depression. NEUROLOGIC: Denies having any numbness or tingling. ENDOCRINE: He does admit to having increased thirst and hunger   and feeling overall fatigued and feeling dehydrated. PHYSICAL EXAMINATION   GENERAL: On exam, this is a pleasant 26-year-old [facility] American   male who is in no acute distress. He is alert and oriented x3 and does   appear to be very tired. HEENT: Normocephalic, atraumatic. PERRLA, EOMI. Hearing is   intact. Nares are patent pink. Tonsillar pillars. Dry mucous membranes. He is noted to have injected conjunctivae. His hearing is intact. NECK: Supple, no JVD, no thyromegaly, no carotid bruits. HEART: Regular rate and rhythm. No murmurs, rubs or gallops. LUNGS: Clear bilaterally. No wheezing, rhonchi, or rales. ABDOMEN: Soft, nontender, nondistended, positive bowel sounds. EXTREMITIES: Have no clubbing, cyanosis or edema. MUSCULOSKELETAL: He has equal strength bilateral upper and   lower extremities. SKIN: Warm and dry. There is no rash, lesions or excoriations noted. PSYCHIATRIC: He has a normal affect. NEUROLOGIC: Cranial nerves 2-12 are grossly intact. VITAL SIGNS: Temperature is 98.1, pulse is 95, respirations 16, blood   pressure 141/67, and he is 98% saturated on room air. LABORATORY DATA: White count of 9.2, H and H of 15.3 and 43.7,   platelets of 451.  Sodium 123, potassium 6, chloride 88, bicarbonate is   15, his anion gap is 20, glucose is 513, BUN of 20, creatinine 1.31. Wound culture was done and is pending. ASSESSMENT AND PLAN:   1. Diabetic ketoacidosis. He has been started on an insulin drip and we   will continue this, getting labs every 4 hours, adjusting for his   electrolyte abnormalities as needed and we will stop his insulin drip   when his anion gap is closed and convert him back to his insulin pump. Will also consult diabetic education for the insulin pump. The patient   appears that perhaps there was a problem with his pump and he would   like some information on how to check that the pump is functioning   properly. 2. Right wrist wound. He was placed on antibiotics as an outpatient   and IV antibiotics at an outside hospital. He reports that the wound   does look better. According to the ER physician, there was still some   pus coming out of the wound. We will continue him on oral antibiotics   with Keflex and await the wound cultures. Also, do daily dressing   changes. 3. Electrolyte abnormalities. This is secondary to the DKA and will be   addressed as needed. DISPOSITION: He will be admitted to inpatient with and expected stay   of 24-48 hours.         Gissel Velazquez DO      CC / CD   D:  10/21/2017   22:06   T:  10/22/2017   05:56   Job #:  [id number]

## 2017-10-22 NOTE — PROGRESS NOTES
[address] October 22, 2017       RE: Hazelton Naverus      To Whom It May Concern,    This is to certify that Hazelton Squibb may may return to work on 10/23/2017. Please feel free to contact my office if you have any questions or concerns. Thank you for your assistance in this matter.       Sincerely,  Damon Peraza NP  [facility]  [phone]

## 2017-10-22 NOTE — PROGRESS NOTES
Problem: DKA: Day 1  Goal: Activity/Safety  Outcome: Progressing Towards Goal  Patient able to ambulate from stretcher to bed without assistance. Patient now resting quietly in bed. Bed in low position, locked bed wheels. Call bell and personal items within reach. Hourly rounding performed. Instructed patient to call when needing assistance.  Patient demonstrates understanding

## 2017-10-22 NOTE — DISCHARGE SUMMARY
Discharge Summary       PATIENT ID: Ann-Marie Guajardo  MRN: [id number]   YOB: 1990    DATE OF ADMISSION: 10/21/2017  6:14 PM    DATE OF DISCHARGE: 10/22/2017  PRIMARY CARE PROVIDER: Marston Mcardle, MD       DISCHARGING PHYSICIAN: Ann Russo NP    To contact this individual call [phone] and ask the  to page. If unavailable ask to be transferred the Adult Hospitalist Department. CONSULTATIONS: IP CONSULT TO HOSPITALIST    PROCEDURES/SURGERIES: * No surgery found *    ADMITTING DIAGNOSES & HOSPITAL COURSE:   This is a 26-year-old [place] American male who has had type 1 diabetes mellitus since the age of   14 and uses an insulin pump. He states that several days ago, he   noted that he had a wound on his right wrist. He noted that it was   increasing and appeared to have an abscess, so he popped this   abscess himself, releasing some pus. It continued to worsen. He went   to an urgent care and was given Bactrim and Keflex. He felt that this   made him sick as he was vomiting and his wound continued to worsen. He then went to a hospital on the [facility]. He was admitted on   Thursday and given IV antibiotics. He was then discharged Friday   morning and states that he was not given any antibiotics to go home   with and he was afraid to take the oral antibiotics that he had leftover   secondary to vomiting. He states he felt that they were too high of a   dose for him. He then noted that his blood sugar has been increasing   steadily over the last 24 hours and he came to the emergency room for   further evaluation. In the emergency room, he was found to be in DKA   and he has been started on an insulin drip in the ICU with IV fluids and   will be admitted for further evaluation and treatment. Mr. Abhilash avendano was found to have Hgb A1C of 11.0. He sees Dr. Cordelia Rob for his DM and admits  To being noncompliant with his diet.  He has established a new PCP and will need sooner follow up then November. I discussed this with the patient and his girlfriend. He was transitioned off the Insulin drip with Lantus and was restarted on his Insulin pump. He was discharged in stable condition on Keflex. DISCHARGE DIAGNOSES / PLAN:      DKA  Hgb A1C 11.0  Non compliant with diet  Dr. Canary Bence, endocrinology follows  AG closed and BMP improved  Lantus 10 units, calculates to 13  Restart Insulin pump at noon    DM with Hyperglycemia and Complications  Follow up with PCP/Endocrine for better management    Right Wrist Wound  Repacked, Daily dressing changes  Keflex    Metabolic Acidosis  Resolved    Disposition: home with PCP fu and Endocrine       PENDING TEST RESULTS:   At the time of discharge the following test results are still pending: final wound culture    FOLLOW UP APPOINTMENTS:    Follow-up Information     Follow up With Details Comments Contact Info    None  please call new PCP and get appt this week None [phone] Patient stated that they have no PCP      Fredy Banks MD Schedule an appointment as soon as possible for a visit in 1 week post DKA fu/ DM management [address] [place] [address]  [phone]                   DISCHARGE MEDICATIONS:  Current Discharge Medication List      START taking these medications    Details   cephALEXin (KEFLEX) 500 mg capsule Take 1 Cap by mouth every six (6) hours for 10 days. Qty: 40 Cap, Refills: 0         CONTINUE these medications which have NOT CHANGED    Details    insulin pump (PATIENT SUPPLIED) misc by SubCUTAneous route as needed. NOTIFY YOUR PHYSICIAN FOR ANY OF THE FOLLOWING:   Fever over 101 degrees for 24 hours. Chest pain, shortness of breath, fever, chills, nausea, vomiting, diarrhea, change in mentation, falling, weakness, bleeding. Severe pain or pain not relieved by medications. Or, any other signs or symptoms that you may have questions about.     DISPOSITION:    Home With:   OT  PT  Merged with [facility]  RN Long term SNF/Inpatient Rehab   x Independent/assisted living    Hospice    Other:       PATIENT CONDITION AT DISCHARGE:     Functional status    Poor     Deconditioned    x Independent      Cognition   x  Lucid     Forgetful     Dementia      Catheters/lines (plus indication)    Looney     PICC     PEG    x None      Code status   x  Full code     DNR      PHYSICAL EXAMINATION AT DISCHARGE:  GENERAL: NAD, pleasant  HEENT: Normocephalic, atraumatic. NECK: Supple, no JVD, no thyromegaly, no carotid bruits. HEART: Regular rate and rhythm. No murmurs, rubs or gallops. LUNGS: Clear bilaterally. No wheezing, rhonchi, or rales. ABDOMEN: Soft, nontender, nondistended, positive bowel sounds. EXTREMITIES: Have no clubbing, cyanosis or edema. MUSCULOSKELETAL: He has equal strength bilateral upper and   lower extremities. SKIN: Warm and dry. There is no rash, lesions or excoriations noted. Right wrist with packing removed. No redness noted and unable to repack. Applied dressing and wrap. PSYCHIATRIC: He has a normal affect.    NEUROLOGIC: Alert and Oriented,   Visit Vitals    /68 (BP 1 Location: Right arm, BP Patient Position: At rest)    Pulse 69    Temp 98.3 °F (36.8 °C)    Resp 14    Ht 5' 7\" (1.702 m)    Wt 67 kg (147 lb 11.3 oz)    SpO2 99%    BMI 23.13 kg/m2           CHRONIC MEDICAL DIAGNOSES:  Problem List as of 10/22/2017  Date Reviewed: 10/22/2017          Codes Class Noted - Resolved    Type 1 diabetes mellitus with hyperglycemia ([facility] ICD-10-CM: E10.65  ICD-9-CM: 250.01  10/22/2017 - Present        DKA (diabetic ketoacidoses) ([facility] ICD-10-CM: E13.10  ICD-9-CM: 250.10  10/21/2017 - Present              Greater than 30 minutes were spent with the patient on counseling and coordination of care    Signed:   Sheba France NP  10/22/2017  9:46 AM

## 2017-10-23 LAB
BACTERIA SPEC CULT: ABNORMAL
GRAM STN SPEC: ABNORMAL
GRAM STN SPEC: ABNORMAL
SERVICE CMNT-IMP: ABNORMAL

## 2017-10-27 LAB
BACTERIA SPEC CULT: NORMAL
SERVICE CMNT-IMP: NORMAL

## 2020-09-17 ENCOUNTER — OFFICE VISIT (OUTPATIENT)
Dept: ENDOCRINOLOGY | Age: 30
End: 2020-09-17
Payer: COMMERCIAL

## 2020-09-17 VITALS
HEART RATE: 76 BPM | DIASTOLIC BLOOD PRESSURE: 69 MMHG | SYSTOLIC BLOOD PRESSURE: 107 MMHG | WEIGHT: 164 LBS | HEIGHT: 67 IN | TEMPERATURE: 97.5 F | BODY MASS INDEX: 25.74 KG/M2 | RESPIRATION RATE: 12 BRPM

## 2020-09-17 DIAGNOSIS — Z79.4 ENCOUNTER FOR LONG-TERM (CURRENT) USE OF INSULIN (HCC): ICD-10-CM

## 2020-09-17 DIAGNOSIS — E10.65 TYPE 1 DIABETES MELLITUS WITH HYPERGLYCEMIA (HCC): Primary | ICD-10-CM

## 2020-09-17 DIAGNOSIS — Z96.41 INSULIN PUMP STATUS: ICD-10-CM

## 2020-09-17 LAB — HBA1C MFR BLD HPLC: 8.4 %

## 2020-09-17 PROCEDURE — 99244 OFF/OP CNSLTJ NEW/EST MOD 40: CPT | Performed by: INTERNAL MEDICINE

## 2020-09-17 PROCEDURE — 83036 HEMOGLOBIN GLYCOSYLATED A1C: CPT | Performed by: INTERNAL MEDICINE

## 2020-09-17 RX ORDER — GLUCAGON INJECTION, SOLUTION 1 MG/.2ML
1 INJECTION, SOLUTION SUBCUTANEOUS AS NEEDED
Qty: 1 BOX | Refills: 0 | Status: SHIPPED | OUTPATIENT
Start: 2020-09-17 | End: 2020-09-23 | Stop reason: SDUPTHER

## 2020-09-17 RX ORDER — INSULIN LISPRO 100 [IU]/ML
INJECTION, SOLUTION INTRAVENOUS; SUBCUTANEOUS
Qty: 3 VIAL | Refills: 3
Start: 2020-09-17 | End: 2020-11-17 | Stop reason: SDUPTHER

## 2020-09-17 RX ORDER — INSULIN GLARGINE 100 [IU]/ML
INJECTION, SOLUTION SUBCUTANEOUS
Qty: 15 ML | Refills: 1 | Status: SHIPPED | OUTPATIENT
Start: 2020-09-17

## 2020-09-17 RX ORDER — INSULIN LISPRO 100 [IU]/ML
INJECTION, SOLUTION INTRAVENOUS; SUBCUTANEOUS
COMMUNITY
Start: 2020-08-07 | End: 2020-09-17 | Stop reason: SDUPTHER

## 2020-09-17 NOTE — PROGRESS NOTES
HISTORY OF PRESENT ILLNESS  Kwadwo Acosta is a 31 y.o. male. HPI  Patient here for initial visit of Type ONE  diabetes mellitus . Referred : by self/pcp    H/o diabetes for 15 years   TRANSFERRED CARE FROM DR. Matt Whittington     15 years of  Diabetes     Current A1C is 8.4 %  and symptoms/problems include none     Current diabetic medications include insulin pump, medtronic  Since age 22  . Current monitoring regimen: home blood tests - daily  Home blood sugar records: trend: fluctuating a lot  Any episodes of hypoglycemia? yes -     Weight trend: stable  Prior visit with dietician: no  Current diet: \"unhealthy\" diet in general  Current exercise: running/ jogging    Known diabetic complications: none  Cardiovascular risk factors: diabetes mellitus, male gender    Eye exam current (within one year): no  SEA: no     Past Medical History:   Diagnosis Date    [facility] ([facility]      History reviewed. No pertinent surgical history. Current Outpatient Medications   Medication Sig    HumaLOG U-100 Insulin 100 unit/mL injection USE UP  UNITS PER A DAY  CONTINUOUSLY VIA  INSULIN PUMP     insulin pump (PATIENT SUPPLIED) misc by SubCUTAneous route as needed. No current facility-administered medications for this visit. Review of Systems   Constitutional: Negative. HENT: Negative. Eyes: Negative. Respiratory: Negative. Cardiovascular: Negative. Gastrointestinal: Negative. Genitourinary: Negative. Musculoskeletal: Negative. Skin: Negative. Neurological: Negative. Endo/Heme/Allergies: Negative. Psychiatric/Behavioral: Negative. Physical Exam  Constitutional:       Appearance: He is well-developed. HENT:      Head: Normocephalic. Eyes:      Conjunctiva/sclera: Conjunctivae normal.      Pupils: Pupils are equal, round, and reactive to light. Neck:      Musculoskeletal: Normal range of motion and neck supple.    Cardiovascular:      Rate and Rhythm: Normal rate and regular rhythm. Pulmonary:      Effort: Pulmonary effort is normal.      Breath sounds: Normal breath sounds. Abdominal:      General: Bowel sounds are normal.      Palpations: Abdomen is soft. Musculoskeletal: Normal range of motion. Skin:     General: Skin is warm and dry. Neurological:      Mental Status: He is alert and oriented to person, place, and time. Deep Tendon Reflexes: Reflexes are normal and symmetric. Reviewed old labs   A1C  was in 9 to 10 % ranges     ASSESSMENT and PLAN    1. Type 1DM, uncontrolled , on insulin pump : a1c is over 8 %     He is on 530 G pump   Discussed the importance of glycemic control and what is stopping him from getting  To control     He has difficulty in carbing -    He does nto check sugars enough     Discussed Medtronic 670 G  And tandem   He wanted to stay with medtronic     Reviewed the glucose log     Strictly advised on using bolus wizard for every food item. Emphasized on avoiding high glycemic index foods and provided some examples. Patient is educated about the importance of glycemic control to prevent progression of complications. Patient is advised about checking blood sugars 6 times a day and maintaining log book. The danger of having low blood sugars has been explained with inappropriate use of insulin  Patient voiced understanding and using the printed instructions at home. specific diabetic recommendations: foot care discussed and Podiatry visits discussed, annual eye examinations at Ophthalmology discussed, glycohemoglobin and other lab monitoring discussed and labs immediately prior to next visit      2. Hypoglycemia :  Educated on treating the hypoglycemia. Discussed Glucagon use and prescribed    3.  HTN :. Patient is educated about importance of compliance with anti-hypertensives especially ARB/ACEI      5. use of aspirin to prevent MI and TIA's discussed      > 50 % of time is spent on counseling   Patient voiced understanding her plan of care

## 2020-09-17 NOTE — PATIENT INSTRUCTIONS
[address] [facility] Do not skip meals Do not eat in between meals Reduce carbs- pasta, rice, potatoes, bread Do not drink juices or sodas, even they are calorie zero or diet drinks Do not eat peanut butter Do not eat sugar free cookies and cakes Do not eat peaches, oranges, pineapples, raisins, grapes , canteloupe , honey dew and fruit medleys 
 
------------------------------------------------------------------------------------------------------------ 
 
humalog  -  90 units a day  ( 3 vials month )  
 
g-voke pen  
 
 
----------------------------------------------------------------------------------------- Back up regimen Check blood sugars immediately before each meal and at bedtime Take toujeo  Or lantus  insulin  30 units  at bed time Take  humalog fredmjev    insulin   12  Gm  : 1 units     units before  Meals Also, add additional humalog  as follows with meals  If blood sugars are[de-identified] 
 
150-200 mg 1 units 201-250 mg 2 units 251-300 mg 3 units 301-350 mg 4 units 351-400 mg 5 units 401-450 mg 6 units 451-500 mg 7 units Less than 70 mg NO INSULIN

## 2020-09-17 NOTE — LETTER
9/20/20 Patient: Power Hernandez  
YOB: 1990 Date of Visit: 9/17/2020 Juan Ellington MD 
[address] [facility] [address] [facility] [address] VIA Facsimile: [phone] Dear Juan Ellington MD, Thank you for referring Mr. Luis M Perez to [facility] for evaluation. My notes for this consultation are attached. If you have questions, please do not hesitate to call me. I look forward to following your patient along with you. Sincerely, Denise Hernández MD

## 2020-09-21 RX ORDER — GLUCAGON INJECTION, SOLUTION 1 MG/.2ML
1 INJECTION, SOLUTION SUBCUTANEOUS AS NEEDED
Qty: 1 ML | Refills: 0 | Status: SHIPPED | OUTPATIENT
Start: 2020-09-21

## 2020-09-23 RX ORDER — GLUCAGON INJECTION, SOLUTION 1 MG/.2ML
1 INJECTION, SOLUTION SUBCUTANEOUS AS NEEDED
Qty: 1 BOX | Refills: 0 | Status: SHIPPED | OUTPATIENT
Start: 2020-09-23

## 2020-10-31 ENCOUNTER — OFFICE VISIT (OUTPATIENT)
Dept: URGENT CARE | Age: 30
End: 2020-10-31
Payer: COMMERCIAL

## 2020-10-31 VITALS — RESPIRATION RATE: 16 BRPM | HEART RATE: 86 BPM | TEMPERATURE: 99 F | OXYGEN SATURATION: 97 %

## 2020-10-31 DIAGNOSIS — J06.9 URI, ACUTE: ICD-10-CM

## 2020-10-31 DIAGNOSIS — R05.9 COUGH: ICD-10-CM

## 2020-10-31 PROCEDURE — 99203 OFFICE O/P NEW LOW 30 MIN: CPT | Performed by: NURSE PRACTITIONER

## 2020-10-31 RX ORDER — BENZONATATE 200 MG/1
200 CAPSULE ORAL
Qty: 30 CAP | Refills: 0 | Status: SHIPPED | OUTPATIENT
Start: 2020-10-31 | End: 2022-01-05

## 2020-10-31 RX ORDER — ALBUTEROL SULFATE 90 UG/1
2 AEROSOL, METERED RESPIRATORY (INHALATION)
Qty: 1 INHALER | Refills: 0 | Status: SHIPPED | OUTPATIENT
Start: 2020-10-31

## 2020-10-31 NOTE — PROGRESS NOTES
OFFICE NOTE    Hugh No is a 31 y.o. male presenting today for the following:  Chief Complaint   Patient presents with    Cold Symptoms     runny nose cough since last week had neg covid 10/26          HPI     Patient presenting today for symptoms of a persistent cough. Patient states a week ago he was having a runny nose, sore throat and cough so went to [facility] and tested negative for COVID on 10/25/2020. Patient states he no longer have those symptoms but still have coughing. He states he tends to cough more when at work then at home. He works in a warehouse. Denies SOB or chest pain. Review of Systems   Constitutional: Negative for chills, fatigue and fever. HENT: Negative. Respiratory: Positive for cough. Negative for chest tightness, shortness of breath and wheezing. Cardiovascular: Negative for chest pain. Musculoskeletal: Negative. Neurological: Negative. History  Past Medical History:   Diagnosis Date    Diabetes [facility])        History reviewed. No pertinent surgical history.     Social History     Socioeconomic History    Marital status: SINGLE     Spouse name: Not on file    Number of children: Not on file    Years of education: Not on file    Highest education level: Not on file   Occupational History    Not on file   Social Needs    Financial resource strain: Not on file    Food insecurity     Worry: Not on file     Inability: Not on file    Transportation needs     Medical: Not on file     Non-medical: Not on file   Tobacco Use    Smoking status: Current Some Day Smoker    Smokeless tobacco: Never Used   Substance and Sexual Activity    Alcohol use: Yes     Comment: 2 x month    Drug use: No    Sexual activity: Not on file   Lifestyle    Physical activity     Days per week: Not on file     Minutes per session: Not on file    Stress: Not on file   Relationships    Social connections     Talks on phone: Not on file     Gets together: Not on file Attends Muslim service: Not on file     Active member of club or organization: Not on file     Attends meetings of clubs or organizations: Not on file     Relationship status: Not on file    Intimate partner violence     Fear of current or ex partner: Not on file     Emotionally abused: Not on file     Physically abused: Not on file     Forced sexual activity: Not on file   Other Topics Concern    Not on file   Social History Narrative    Not on file       No Known Allergies    Current Outpatient Medications   Medication Sig Dispense Refill    benzonatate (TESSALON) 200 mg capsule Take 1 Cap by mouth three (3) times daily as needed for Cough. 30 Cap 0    albuterol (PROVENTIL HFA, VENTOLIN HFA, PROAIR HFA) 90 mcg/actuation inhaler Take 2 Puffs by inhalation every four (4) hours as needed for Wheezing or Cough. 1 Inhaler 0    glucagon (Gvoke PFS 2-Pack Syringe) 1 mg/0.2 mL syrg 1 mg by SubCUTAneous route as needed (hypoglycemia). 1 Box 0    glucagon (Gvoke HypoPen 1-Pack) 1 mg/0.2 mL atIn 1 mg by SubCUTAneous route as needed (episode of hypoglycemia). 1 mL 0    HumaLOG U-100 Insulin 100 unit/mL injection USE UP TO 90 UNITS PER A DAY  CONTINUOUSLY VIA  INSULIN PUMP 3 Vial 3    insulin glargine (LANTUS,BASAGLAR) 100 unit/mL (3 mL) inpn Use to inject 30 units at bedtime, use if pump fails 15 mL 1     insulin pump (PATIENT SUPPLIED) misc by SubCUTAneous route as needed. Patient Care Team:  Patient Care Team:  Eunice Barrera MD as PCP - General (Family Medicine)      LABS:  No results found for any visits on 10/31/20. RADIOLOGY:  No recent results      Physical Exam  Constitutional:       General: He is not in acute distress. Appearance: Normal appearance. He is well-developed. He is not ill-appearing. HENT:      Nose: No congestion or rhinorrhea. Mouth/Throat:      Pharynx: No oropharyngeal exudate or posterior oropharyngeal erythema.    Cardiovascular:      Rate and Rhythm: Normal rate and regular rhythm. Heart sounds: Normal heart sounds. Pulmonary:      Effort: Pulmonary effort is normal.      Breath sounds: Normal breath sounds. No wheezing or rhonchi. Neurological:      Mental Status: He is alert and oriented to person, place, and time. Psychiatric:         Mood and Affect: Mood normal.           PHQ Screen:  No flowsheet data found. Vitals:    10/31/20 0824   Pulse: 86   Resp: 16   Temp: 99 °F (37.2 °C)   SpO2: 97%         Assessment and Plan    1. URI, acute  Advised patient to increase water intake. Take tylenol for discomfort. Use a humidifer if needed. Can use OTC cough and cold medication if needed. 2. Cough  - benzonatate (TESSALON) 200 mg capsule; Take 1 Cap by mouth three (3) times daily as needed for Cough. Dispense: 30 Cap; Refill: 0  - albuterol (PROVENTIL HFA, VENTOLIN HFA, PROAIR HFA) 90 mcg/actuation inhaler; Take 2 Puffs by inhalation every four (4) hours as needed for Wheezing or Cough. Dispense: 1 Inhaler; Refill: 0      MDM  Number of Diagnoses or Management Options  Cough:   URI, acute:       Procedures      *Plan of care reviewed with patient. Patient in agreement with plan and expresses understanding. All questions answered and patient encouraged to call or RTO if further questions or concerns. Advised patient if symptoms worsen to go to nearest ER or call 911. AVS and recommendations given to patient upon discharge.

## 2020-11-17 ENCOUNTER — OFFICE VISIT (OUTPATIENT)
Dept: ENDOCRINOLOGY | Age: 30
End: 2020-11-17
Payer: COMMERCIAL

## 2020-11-17 VITALS
RESPIRATION RATE: 18 BRPM | TEMPERATURE: 98.2 F | OXYGEN SATURATION: 99 % | BODY MASS INDEX: 25.74 KG/M2 | WEIGHT: 164 LBS | HEIGHT: 67 IN | HEART RATE: 85 BPM

## 2020-11-17 DIAGNOSIS — E10.65 TYPE 1 DIABETES MELLITUS WITH HYPERGLYCEMIA (HCC): Primary | ICD-10-CM

## 2020-11-17 DIAGNOSIS — Z79.4 ENCOUNTER FOR LONG-TERM (CURRENT) USE OF INSULIN (HCC): ICD-10-CM

## 2020-11-17 DIAGNOSIS — Z96.41 INSULIN PUMP STATUS: ICD-10-CM

## 2020-11-17 LAB
ALBUMIN SERPL-MCNC: 4.4 G/DL (ref 4–5)
ALBUMIN/CREAT UR: 30 MG/G CREAT (ref 0–29)
ALBUMIN/GLOB SERPL: 1.8 {RATIO} (ref 1.2–2.2)
ALP SERPL-CCNC: 92 IU/L (ref 39–117)
ALT SERPL-CCNC: 14 IU/L (ref 0–44)
AST SERPL-CCNC: 23 IU/L (ref 0–40)
BILIRUB SERPL-MCNC: 1 MG/DL (ref 0–1.2)
BUN SERPL-MCNC: 7 MG/DL (ref 6–20)
BUN/CREAT SERPL: 8 (ref 9–20)
C PEPTIDE SERPL-MCNC: <0.1 NG/ML (ref 1.1–4.4)
CALCIUM SERPL-MCNC: 9.5 MG/DL (ref 8.7–10.2)
CHLORIDE SERPL-SCNC: 104 MMOL/L (ref 96–106)
CHOLEST SERPL-MCNC: 129 MG/DL (ref 100–199)
CO2 SERPL-SCNC: 26 MMOL/L (ref 20–29)
CREAT SERPL-MCNC: 0.87 MG/DL (ref 0.76–1.27)
CREAT UR-MCNC: 238.5 MG/DL
EST. AVERAGE GLUCOSE BLD GHB EST-MCNC: 209 MG/DL
GAD65 AB SER IA-ACNC: <5 U/ML (ref 0–5)
GLOBULIN SER CALC-MCNC: 2.5 G/DL (ref 1.5–4.5)
GLUCOSE SERPL-MCNC: 65 MG/DL (ref 65–99)
HBA1C MFR BLD: 8.9 % (ref 4.8–5.6)
HDLC SERPL-MCNC: 46 MG/DL
INTERPRETATION, 910389: NORMAL
LDLC SERPL CALC-MCNC: 73 MG/DL (ref 0–99)
Lab: NORMAL
MICROALBUMIN UR-MCNC: 72.5 UG/ML
POTASSIUM SERPL-SCNC: 3.7 MMOL/L (ref 3.5–5.2)
PROT SERPL-MCNC: 6.9 G/DL (ref 6–8.5)
SODIUM SERPL-SCNC: 141 MMOL/L (ref 134–144)
TRIGL SERPL-MCNC: 41 MG/DL (ref 0–149)
VLDLC SERPL CALC-MCNC: 10 MG/DL (ref 5–40)

## 2020-11-17 PROCEDURE — 99214 OFFICE O/P EST MOD 30 MIN: CPT | Performed by: INTERNAL MEDICINE

## 2020-11-17 PROCEDURE — 3052F HG A1C>EQUAL 8.0%<EQUAL 9.0%: CPT | Performed by: INTERNAL MEDICINE

## 2020-11-17 RX ORDER — INSULIN LISPRO 100 [IU]/ML
INJECTION, SOLUTION INTRAVENOUS; SUBCUTANEOUS
Qty: 3 VIAL | Refills: 4
Start: 2020-11-17 | End: 2021-05-11 | Stop reason: SDUPTHER

## 2020-11-17 NOTE — PROGRESS NOTES
1. Have you been to the ER, urgent care clinic since your last visit?no  Hospitalized since your last visit? no    2. Have you seen or consulted any other health care providers outside of the [address] [place] [address] since your last visit? Include any pap smears or colon screening. no    Wt Readings from Last 3 Encounters:   11/17/20 164 lb (74.4 kg)   09/17/20 164 lb (74.4 kg)   10/22/17 147 lb 11.3 oz (67 kg)     Temp Readings from Last 3 Encounters:   11/17/20 98.2 °F (36.8 °C) (Oral)   10/31/20 99 °F (37.2 °C)   09/17/20 97.5 °F (36.4 °C) (Oral)     BP Readings from Last 3 Encounters:   09/17/20 107/69   10/22/17 142/72     Pulse Readings from Last 3 Encounters:   11/17/20 85   10/31/20 86   09/17/20 76     Lab Results   Component Value Date/Time    Hemoglobin A1c 11.0 (H) 10/22/2017 12:47 AM    Hemoglobin A1c (POC) 8.4 09/17/2020 10:38 AM

## 2020-11-17 NOTE — LETTER
11/18/20 Patient: Justus Moran  
YOB: 1990 Date of Visit: 11/17/2020 Renetta Ross MD 
[facility] [address] [facility] [address] [facility] [address] VIA Facsimile: [phone] Dear Renetta Ross MD, Thank you for referring Mr. Kassandra Rodriguez to [facility] for evaluation. My notes for this consultation are attached. If you have questions, please do not hesitate to call me. I look forward to following your patient along with you. Sincerely, Jeovany Morris MD

## 2020-11-17 NOTE — PATIENT INSTRUCTIONS
[address] [facility] Do not skip meals Do not eat in between meals Reduce carbs- pasta, rice, potatoes, bread Do not drink juices or sodas, even they are calorie zero or diet drinks Do not eat peanut butter Do not eat sugar free cookies and cakes Do not eat peaches, oranges, pineapples, raisins, grapes , canteloupe , honey dew and fruit medleys 
 
------------------------------------------------------------------------------------------------------------ 
 
humalog  -  90 units a day  ( 3 vials month )  
 
g-voke pen  
 
 
----------------------------------------------------------------------------------------- Back up regimen Check blood sugars immediately before each meal and at bedtime Take toujeo  Or lantus  insulin  30 units  at bed time Take  humalog fredmjev    insulin   12  Gm  : 1 units     units before  Meals Also, add additional humalog  as follows with meals  If blood sugars are[de-identified] 
 
150-200 mg 1 units 201-250 mg 2 units 251-300 mg 3 units 301-350 mg 4 units 351-400 mg 5 units 401-450 mg 6 units 451-500 mg 7 units Less than 70 mg NO INSULIN

## 2020-11-17 NOTE — PROGRESS NOTES
HISTORY OF PRESENT ILLNESS  Sergo Salas is a 31 y.o. male. HPI  Patient here for first follow up after  initial visit of Type ONE  diabetes mellitus   From sept 2020        On medtronic pump 550 G   He says newer version is not covered     He over indulged in food, requesting carb counting book         Old history :     Referred : by self/pcp    H/o diabetes for 15 years   TRANSFERRED CARE FROM DR. Garry Matute   15 years of  Diabetes   Current A1C is 8.4 %  and symptoms/problems include none   Current diabetic medications include insulin pump, medtronic  Since age 22   Current monitoring regimen: home blood tests - daily  Home blood sugar records: trend: fluctuating a lot  Any episodes of hypoglycemia? yes -     Weight trend: stable  Prior visit with dietician: no  Current diet: \"unhealthy\" diet in general  Current exercise: running/ jogging    Known diabetic complications: none  Cardiovascular risk factors: diabetes mellitus, male gender    Eye exam current (within one year): no  SEA: no     Past Medical History:   Diagnosis Date    [facility]      History reviewed. No pertinent surgical history. Current Outpatient Medications   Medication Sig    benzonatate (TESSALON) 200 mg capsule Take 1 Cap by mouth three (3) times daily as needed for Cough.  albuterol (PROVENTIL HFA, VENTOLIN HFA, PROAIR HFA) 90 mcg/actuation inhaler Take 2 Puffs by inhalation every four (4) hours as needed for Wheezing or Cough.  glucagon (Gvoke PFS 2-Pack Syringe) 1 mg/0.2 mL syrg 1 mg by SubCUTAneous route as needed (hypoglycemia).  glucagon (Gvoke HypoPen 1-Pack) 1 mg/0.2 mL atIn 1 mg by SubCUTAneous route as needed (episode of hypoglycemia).     HumaLOG U-100 Insulin 100 unit/mL injection USE UP TO 90 UNITS PER A DAY  CONTINUOUSLY VIA  INSULIN PUMP    insulin glargine (LANTUS,BASAGLAR) 100 unit/mL (3 mL) inpn Use to inject 30 units at bedtime, use if pump fails     insulin pump (PATIENT SUPPLIED) misc by SubCUTAneous route as needed. No current facility-administered medications for this visit. Review of Systems   Constitutional: Negative. HENT: Negative. Eyes: Negative. Respiratory: Negative. Cardiovascular: Negative. Gastrointestinal: Negative. Genitourinary: Negative. Musculoskeletal: Negative. Skin: Negative. Neurological: Negative. Endo/Heme/Allergies: Negative. Psychiatric/Behavioral: Negative. Physical Exam  Constitutional:       Appearance: He is well-developed. HENT:      Head: Normocephalic. Eyes:      Conjunctiva/sclera: Conjunctivae normal.      Pupils: Pupils are equal, round, and reactive to light. Neck:      Musculoskeletal: Normal range of motion and neck supple. Cardiovascular:      Rate and Rhythm: Normal rate and regular rhythm. Pulmonary:      Effort: Pulmonary effort is normal.      Breath sounds: Normal breath sounds. Abdominal:      General: Bowel sounds are normal.      Palpations: Abdomen is soft. Musculoskeletal: Normal range of motion. Skin:     General: Skin is warm and dry. Neurological:      Mental Status: He is alert and oriented to person, place, and time. Deep Tendon Reflexes: Reflexes are normal and symmetric. Reviewed old labs   A1C  was in 9 to 10 % ranges     ASSESSMENT and PLAN    1.  Type 1DM, uncontrolled , on insulin pump : a1c is over 8 %     Nov 2020   Pt is still on 65  G medtronic pump   He says he got a letter that said the upgrade is not covered   He did not carb at all   He does not log blood sugars on to pump   Again encouraged him to learn carbs and to document         Sept 2020     He is on 65 G medtronic pump   Discussed the importance of glycemic control and what is stopping him from getting  To control   He has difficulty in carbing -    He does nto check sugars enough   Discussed Medtronic 670 G  And tandem   He wanted to stay with medtronic   Reviewed the glucose log   Strictly advised on using bolus wizard for every food item. Emphasized on avoiding high glycemic index foods and provided some examples. Patient is educated about the importance of glycemic control to prevent progression of complications. Patient is advised about checking blood sugars 6 times a day and maintaining log book. 2. Hypoglycemia :  Educated on treating the hypoglycemia. Discussed Glucagon use and prescribed    3.  HTN :. Patient is educated about importance of compliance with anti-hypertensives especially ARB/ACEI      5. use of aspirin to prevent MI and TIA's discussed      > 50 % of time is spent on counseling   Patient voiced understanding her plan of care

## 2021-02-04 ENCOUNTER — TELEPHONE (OUTPATIENT)
Dept: ENDOCRINOLOGY | Age: 31
End: 2021-02-04

## 2021-02-04 RX ORDER — SYRINGE AND NEEDLE,INSULIN,1ML 25GX1"
SYRINGE, EMPTY DISPOSABLE MISCELLANEOUS
Qty: 100 SYRINGE | Refills: 6 | Status: SHIPPED | OUTPATIENT
Start: 2021-02-04 | End: 2021-05-11 | Stop reason: SDUPTHER

## 2021-02-04 NOTE — TELEPHONE ENCOUNTER
Patient calling to see if received paperwork from [facility] for pump supplies. Patient is out and needs supplies. Patient has Medtronic pump. Patient needs to get syringes sent to [facility] until his supplies comes in.

## 2021-02-04 NOTE — TELEPHONE ENCOUNTER
Spoke to [facility] [employer]. Rep states they do not need anything from our office at this time. Rep states they are waiting for a response from patient's insurance. Patient informed of above info.  And syringes sent in to local pharmacy per patient request.

## 2021-05-06 DIAGNOSIS — E10.10 DIABETIC KETOACIDOSIS WITHOUT COMA ASSOCIATED WITH TYPE 1 DIABETES MELLITUS (HCC): ICD-10-CM

## 2021-05-06 DIAGNOSIS — Z79.4 ENCOUNTER FOR LONG-TERM (CURRENT) USE OF INSULIN (HCC): ICD-10-CM

## 2021-05-06 DIAGNOSIS — E10.65 TYPE 1 DIABETES MELLITUS WITH HYPERGLYCEMIA (HCC): Primary | ICD-10-CM

## 2021-05-07 LAB
25(OH)D3+25(OH)D2 SERPL-MCNC: 14.4 NG/ML (ref 30–100)
ALBUMIN SERPL-MCNC: 4.5 G/DL (ref 4–5)
ALBUMIN/CREAT UR: 45 MG/G CREAT (ref 0–29)
ALBUMIN/GLOB SERPL: 2.4 {RATIO} (ref 1.2–2.2)
ALP SERPL-CCNC: 88 IU/L (ref 39–117)
ALT SERPL-CCNC: 16 IU/L (ref 0–44)
AST SERPL-CCNC: 21 IU/L (ref 0–40)
BILIRUB SERPL-MCNC: 0.9 MG/DL (ref 0–1.2)
BUN SERPL-MCNC: 8 MG/DL (ref 6–20)
BUN/CREAT SERPL: 9 (ref 9–20)
CALCIUM SERPL-MCNC: 9 MG/DL (ref 8.7–10.2)
CHLORIDE SERPL-SCNC: 103 MMOL/L (ref 96–106)
CHOLEST SERPL-MCNC: 122 MG/DL (ref 100–199)
CO2 SERPL-SCNC: 23 MMOL/L (ref 20–29)
CREAT SERPL-MCNC: 0.86 MG/DL (ref 0.76–1.27)
CREAT UR-MCNC: 290.1 MG/DL
EST. AVERAGE GLUCOSE BLD GHB EST-MCNC: 200 MG/DL
GLOBULIN SER CALC-MCNC: 1.9 G/DL (ref 1.5–4.5)
GLUCOSE SERPL-MCNC: 133 MG/DL (ref 65–99)
HBA1C MFR BLD: 8.6 % (ref 4.8–5.6)
HDLC SERPL-MCNC: 41 MG/DL
IMP & REVIEW OF LAB RESULTS: NORMAL
LDLC SERPL CALC-MCNC: 70 MG/DL (ref 0–99)
MICROALBUMIN UR-MCNC: 131 UG/ML
POTASSIUM SERPL-SCNC: 3.5 MMOL/L (ref 3.5–5.2)
PROT SERPL-MCNC: 6.4 G/DL (ref 6–8.5)
SODIUM SERPL-SCNC: 141 MMOL/L (ref 134–144)
SPECIMEN STATUS REPORT, ROLRST: NORMAL
TRIGL SERPL-MCNC: 44 MG/DL (ref 0–149)
VLDLC SERPL CALC-MCNC: 11 MG/DL (ref 5–40)

## 2021-05-11 ENCOUNTER — OFFICE VISIT (OUTPATIENT)
Dept: ENDOCRINOLOGY | Age: 31
End: 2021-05-11
Payer: COMMERCIAL

## 2021-05-11 VITALS
HEIGHT: 67 IN | SYSTOLIC BLOOD PRESSURE: 118 MMHG | HEART RATE: 81 BPM | BODY MASS INDEX: 26.59 KG/M2 | OXYGEN SATURATION: 99 % | TEMPERATURE: 96.5 F | DIASTOLIC BLOOD PRESSURE: 70 MMHG | RESPIRATION RATE: 18 BRPM | WEIGHT: 169.4 LBS

## 2021-05-11 DIAGNOSIS — E10.65 TYPE 1 DIABETES MELLITUS WITH HYPERGLYCEMIA (HCC): Primary | ICD-10-CM

## 2021-05-11 DIAGNOSIS — Z79.4 ENCOUNTER FOR LONG-TERM (CURRENT) USE OF INSULIN (HCC): ICD-10-CM

## 2021-05-11 DIAGNOSIS — Z96.41 INSULIN PUMP STATUS: ICD-10-CM

## 2021-05-11 PROCEDURE — 3052F HG A1C>EQUAL 8.0%<EQUAL 9.0%: CPT | Performed by: INTERNAL MEDICINE

## 2021-05-11 PROCEDURE — 99214 OFFICE O/P EST MOD 30 MIN: CPT | Performed by: INTERNAL MEDICINE

## 2021-05-11 RX ORDER — INSULIN LISPRO 100 [IU]/ML
INJECTION, SOLUTION INTRAVENOUS; SUBCUTANEOUS
Qty: 3 VIAL | Refills: 4
Start: 2021-05-11 | End: 2021-07-13

## 2021-05-11 RX ORDER — INSULIN LISPRO 100 [IU]/ML
30 INJECTION, SOLUTION INTRAVENOUS; SUBCUTANEOUS
COMMUNITY
End: 2022-07-13 | Stop reason: SDUPTHER

## 2021-05-11 RX ORDER — SYRINGE AND NEEDLE,INSULIN,1ML 25GX1"
SYRINGE, EMPTY DISPOSABLE MISCELLANEOUS
Qty: 100 SYRINGE | Refills: 6 | Status: SHIPPED | OUTPATIENT
Start: 2021-05-11 | End: 2022-09-15 | Stop reason: SDUPTHER

## 2021-05-11 RX ORDER — INSULIN LISPRO 100 [IU]/ML
30 INJECTION, SOLUTION INTRAVENOUS; SUBCUTANEOUS
Qty: 1 PACKAGE | Status: CANCELLED | OUTPATIENT
Start: 2021-05-11

## 2021-05-11 NOTE — PATIENT INSTRUCTIONS
SPECIFIC INSTRUCTIONS BELOW         DRINK water   Do not skip meals  Do not eat in between meals    Reduce carbs- pasta, rice, potatoes, bread   Do not drink juices or sodas, even they are calorie zero or diet drinks  Do not eat peanut butter     Do not eat sugar free cookies and cakes   Do not eat peaches, oranges, pineapples, raisins, grapes , canteloupe , honey dew and fruit medleys    ------------------------------------------------------------------------------------------------------------    humalog  -  90 units a day  ( 3 vials month ) medtronic   530 G       -----------------------------------------------------------------------------------------    Back up regimen       Check blood sugars immediately before each meal and at bedtime      Take toujeo  Or lantus  insulin  30 units  at bed time    Take  humalog lyeumjev    insulin   10  Gm  : 1 units     units before  Meals        Also, add additional humalog  as follows with meals  If blood sugars are[de-identified]    150-200 mg 1 units    201-250 mg 2 units    251-300 mg 3 units    301-350 mg 4 units    351-400 mg 5 units    401-450 mg 6 units    451-500 mg 7 units     Less than 70 mg NO INSULIN        PAY ATTENTION TO THESE GENERAL INSTRUCTIONS     -ANY tests other than blood work, which you opt to do  outside [facility] imaging facilities, you are responsible for prior authorizations if  required   - HEALTH MAINTENANCE IS NOT GOING TO BE UP TO DATE ON YOUR AVS- PLEASE IGNORE   - YOUR MED LIST IS NOT UP TO DATE AS SOME CHANGES ARE BEING MADE AFTER THE VISIT - FOLLOW SPECIFIC INSTRUCTIONS  ABOVE     Results     *Normal results will not be notified by a phone call starting January 1 2021   *If you have an upcoming visit, the results will be discussed at the visit   *Please sign up for MY CHART if you want access to your lab and test results  *Abnormal results which require immediate attention will be notified by phone call   *Abnormal results which do not require immediate assistance will be notified in 1-2 weeks       Refills    -    have your pharmacy send us a refill request  Phone calls  -  Allow  24 hrs.  for non-urgent calls to be returned  Prior authorization - It may take 2-4 weeks to process  Forms  -  FMLA, DMV etc., will take up to 2 weeks to process  Cancellations - please notify the office 2 days in advance   Samples  - will only be dispensed at visits     --------------------------------------------------------------------------------------------

## 2021-05-11 NOTE — PROGRESS NOTES
Room 3    Identified pt with two pt identifiers(name and ). Reviewed record in preparation for visit and have obtained necessary documentation. All patient medications has been reviewed. Chief Complaint   Patient presents with    Diabetes       3 most recent PHQ Screens 2021   Little interest or pleasure in doing things Not at all   Feeling down, depressed, irritable, or hopeless Not at all   Total Score PHQ 2 0     Abuse Screening Questionnaire 2021   Do you ever feel afraid of your partner? N   Are you in a relationship with someone who physically or mentally threatens you? N   Is it safe for you to go home? Y       Health Maintenance Review: Patient reminded of \"due or due soon\" health maintenance. I have asked the patient to contact his/her primary care provider (PCP) for follow-up on his/her health maintenance. Vitals:    21 1433   BP: 118/70   Pulse: 81   Resp: 18   Temp: (!) 96.5 °F (35.8 °C)   TempSrc: Oral   SpO2: 99%   Weight: 169 lb 6.4 oz (76.8 kg)   Height: 5' 7\" (1.702 m)   PainSc:   0 - No pain       Wt Readings from Last 3 Encounters:   21 169 lb 6.4 oz (76.8 kg)   20 164 lb (74.4 kg)   20 164 lb (74.4 kg)     Temp Readings from Last 3 Encounters:   21 (!) 96.5 °F (35.8 °C) (Oral)   20 98.2 °F (36.8 °C) (Oral)   10/31/20 99 °F (37.2 °C)     BP Readings from Last 3 Encounters:   21 118/70   20 107/69   10/22/17 142/72     Pulse Readings from Last 3 Encounters:   21 81   20 85   10/31/20 86       Lab Results   Component Value Date/Time    Hemoglobin A1c 8.6 (H) 2021 12:00 AM    Hemoglobin A1c (POC) 8.4 2020 10:38 AM       Coordination of Care Questionnaire:   1) Have you been to an emergency room, urgent care, or hospitalized since your last visit?   no       2. Have seen or consulted any other health care provider since your last visit?  NO    Advance Care Planning:   End of Life Planning: DNI/DNR    Patient is accompanied by self I have received verbal consent from Matt Gamez to discuss any/all medical information while they are present in the room.

## 2021-05-11 NOTE — PROGRESS NOTES
HISTORY OF PRESENT ILLNESS  Russell Ramos is a 31 y.o. male. Patient here for first follow up after  initial visit of Type ONE  diabetes mellitus   From sept 2020      On medtronic pump 550 G   He agrees that he has to learn carb counting     Old history :     Referred : by self/pcp  H/o diabetes for 15 years   TRANSFERRED CARE FROM DR. Buzz Bueno   15 years of  Diabetes   Current A1C is 8.4 %  and symptoms/problems include none   Current diabetic medications include insulin pump, medtronic  Since age 22   Current monitoring regimen: home blood tests - daily      Reviewed old labs   A1C  was in 9 to 10 % ranges       Lab Results   Component Value Date/Time    Hemoglobin A1c 8.6 (H) 05/06/2021 12:00 AM    Hemoglobin A1c 8.9 (H) 11/11/2020 04:34 PM    Hemoglobin A1c 11.0 (H) 10/22/2017 12:47 AM    Glucose 133 (H) 05/06/2021 12:00 AM    Glucose (POC) 208 (H) 10/22/2017 04:28 PM    Microalb/Creat ratio (ug/mg creat.) 45 (H) 05/06/2021 12:00 AM    LDL, calculated 70 05/06/2021 12:00 AM    Creatinine 0.86 05/06/2021 12:00 AM      Lab Results   Component Value Date/Time    Cholesterol, total 122 05/06/2021 12:00 AM    HDL Cholesterol 41 05/06/2021 12:00 AM    LDL, calculated 70 05/06/2021 12:00 AM    Triglyceride 44 05/06/2021 12:00 AM     Lab Results   Component Value Date/Time    ALT (SGPT) 16 05/06/2021 12:00 AM    Alk.  phosphatase 88 05/06/2021 12:00 AM    Bilirubin, total 0.9 05/06/2021 12:00 AM    Albumin 4.5 05/06/2021 12:00 AM    Protein, total 6.4 05/06/2021 12:00 AM    PLATELET 830 48/80/7224 05:17 AM     Lab Results   Component Value Date/Time    GFR est non- 05/06/2021 12:00 AM    GFR est  05/06/2021 12:00 AM    Creatinine 0.86 05/06/2021 12:00 AM    BUN 8 05/06/2021 12:00 AM    Sodium 141 05/06/2021 12:00 AM    Potassium 3.5 05/06/2021 12:00 AM    Chloride 103 05/06/2021 12:00 AM    CO2 23 05/06/2021 12:00 AM    Magnesium 1.8 10/22/2017 05:17 AM    Phosphorus 3.6 10/22/2017 12:49 AM ASSESSMENT and PLAN    1. Type 1DM, uncontrolled , on insulin pump : a1c is    8.6 %   From May 2021     Compared to    over 8.9 from nov 2020        May 2021     On medtronic pump   Reviewed  The download   Decreased carb ratio from 12 gm to 10 gm   Referred him to carb counting class     I would like him to be on  CGM  I can give him GAGAN 2        Nov 2020   Pt is still on 65  G medtronic pump   He says he got a letter that said the upgrade is not covered   He did not carb at all   He does not log blood sugars on to pump   Again encouraged him to learn carbs and to document         Sept 2020     He is on 65 G medtronic pump   Discussed the importance of glycemic control and what is stopping him from getting  To control   He has difficulty in carbing -    He does nto check sugars enough   Discussed Medtronic 670 G  And tandem   He wanted to stay with medtronic   Reviewed the glucose log     2. Hypoglycemia :  Educated on treating the hypoglycemia. Discussed Glucagon use and prescribed    3.  HTN :. Patient is educated about importance of compliance with anti-hypertensives especially ARB/ACEI      5. use of aspirin to prevent MI and TIA's discussed        Reviewed results with patient and discussed the labs being ordered today/bnv  Patient voiced understanding of plan of care

## 2021-08-13 ENCOUNTER — DOCUMENTATION ONLY (OUTPATIENT)
Dept: ENDOCRINOLOGY | Age: 31
End: 2021-08-13

## 2021-08-13 NOTE — PROGRESS NOTES
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

## 2022-01-05 ENCOUNTER — OFFICE VISIT (OUTPATIENT)
Dept: ENDOCRINOLOGY | Age: 32
End: 2022-01-05
Payer: COMMERCIAL

## 2022-01-05 VITALS
BODY MASS INDEX: 26.15 KG/M2 | RESPIRATION RATE: 16 BRPM | WEIGHT: 166.6 LBS | OXYGEN SATURATION: 99 % | HEIGHT: 67 IN | TEMPERATURE: 97.3 F | HEART RATE: 71 BPM

## 2022-01-05 DIAGNOSIS — E10.65 TYPE 1 DIABETES MELLITUS WITH HYPERGLYCEMIA (HCC): Primary | ICD-10-CM

## 2022-01-05 LAB — HBA1C MFR BLD HPLC: 9.2 %

## 2022-01-05 PROCEDURE — 83036 HEMOGLOBIN GLYCOSYLATED A1C: CPT | Performed by: INTERNAL MEDICINE

## 2022-01-05 PROCEDURE — 99214 OFFICE O/P EST MOD 30 MIN: CPT | Performed by: INTERNAL MEDICINE

## 2022-01-05 NOTE — PATIENT INSTRUCTIONS
SPECIFIC INSTRUCTIONS BELOW       DRINK water   Do not skip meals  Do not eat in between meals    Reduce carbs- pasta, rice, potatoes, bread   Do not drink juices or sodas, even they are calorie zero or diet drinks  Do not eat peanut butter     Do not eat sugar free cookies and cakes   Do not eat peaches, oranges, pineapples, raisins, grapes , canteloupe , honey dew and fruit medleys    ------------------------------------------------------------------------------------------------------------    humalog  -  90 units a day  ( 3 vials month ) medtronic   530 G       -----------------------------------------------------------------------------------------    Back up regimen       Check blood sugars immediately before each meal and at bedtime      Take toujeo  Or lantus  insulin  30 units  at bed time    Take  humalog lyeumjev    insulin   10  Gm  : 1 units     units before  Meals        Also, add additional humalog  as follows with meals  If blood sugars are[de-identified]    150-200 mg 1 units    201-250 mg 2 units    251-300 mg 3 units    301-350 mg 4 units    351-400 mg 5 units    401-450 mg 6 units    451-500 mg 7 units     Less than 70 mg NO INSULIN          -------------PAY ATTENTION TO THESE GENERAL INSTRUCTIONS -----------------      - The medications prescribed at this visit will not be available at pharmacy until 6 pm       - YOUR MED LIST IS NOT UP TO DATE AS SOME CHANGES ARE BEING MADE AFTER THE VISIT - FOLLOW SPECIFIC INSTRUCTIONS  ABOVE     -ANY tests other than blood work, which you opt to do  outside the  [facility] imaging facilities, you are responsible for prior authorizations if  required    - 18 Kira Bonner UP TO DATE ON YOUR AVS- PLEASE IGNORE     Results     *Normal results will not be notified by a phone call starting January 1 2021   *If you have an upcoming visit, the results will be discussed at the visit   *Please sign up for MY CHART if you want access to your lab and test results  *Abnormal results which require immediate attention will be notified by phone call   *Abnormal results which do not require immediate assistance will be notified in 1-2 weeks       Refills    -    have your pharmacy send us a refill request . Refills are done max for one year and a visit is a must before refills are extended    Follow up appointments -  highly encourage you to make it when you are checking out. We can accommodate you into the schedule based on your clinical situation, but not for extending refills beyond a year. Labs are important to give refills and is important to get labs before the visit     Phone calls  -  Allow  24 hrs.  for non-urgent calls to be returned  Prior authorization - It may take 2-4 weeks to process  Forms  -  FMLA, DMV etc., will take up to 2 weeks to process  Cancellations - please notify the office 2 days in advance   Samples  - will only be dispensed at visits       If not showing for the appointments and cancelling appointments within 24 hours are kept track of and three  of such situations in  two consecutive years will likely be considered for termination from the practice    -------------------------------------------------------------------------------------------------------------------

## 2022-01-05 NOTE — LETTER
1/5/2022    Patient: Ruth Cervantes   YOB: 1990   Date of Visit: 1/5/2022     Pat Nieves MD [address] [address]  Via Fax: [phone]    Dear Pat Nieves MD,      Thank you for referring Mr. Pop Maza to [facility] for evaluation. My notes for this consultation are attached. If you have questions, please do not hesitate to call me. I look forward to following your patient along with you.       Sincerely,    Denita Ayers MD

## 2022-01-05 NOTE — PROGRESS NOTES
HISTORY OF PRESENT ILLNESS  Ashwin Knight is a 27 y.o. male. Patient here for  follow up after  initial visit of Type ONE  diabetes mellitus   From  May 2021     He stayed home because of multiple quarantines   He did not exercise     He has no meter with him   Says he still owes money to [employer] on old meter itself         May 2021     On medtronic pump 550 G   He agrees that he has to learn carb counting     Old history :     Referred : by self/pcp  H/o diabetes for 15 years   TRANSFERRED CARE FROM DR. Adry Rodríguez   15 years of  Diabetes   Current A1C is 8.4 %  and symptoms/problems include none   Current diabetic medications include insulin pump, medtronic  Since age 22   Current monitoring regimen: home blood tests - daily      Reviewed old labs   A1C  was in 9 to 10 % ranges       Lab Results   Component Value Date/Time    Hemoglobin A1c 8.6 (H) 05/06/2021 12:00 AM    Hemoglobin A1c 8.9 (H) 11/11/2020 04:34 PM    Hemoglobin A1c 11.0 (H) 10/22/2017 12:47 AM    Glucose 133 (H) 05/06/2021 12:00 AM    Glucose (POC) 208 (H) 10/22/2017 04:28 PM    Microalb/Creat ratio (ug/mg creat.) 45 (H) 05/06/2021 12:00 AM    LDL, calculated 70 05/06/2021 12:00 AM    Creatinine 0.86 05/06/2021 12:00 AM      Lab Results   Component Value Date/Time    Cholesterol, total 122 05/06/2021 12:00 AM    HDL Cholesterol 41 05/06/2021 12:00 AM    LDL, calculated 70 05/06/2021 12:00 AM    Triglyceride 44 05/06/2021 12:00 AM     Lab Results   Component Value Date/Time    ALT (SGPT) 16 05/06/2021 12:00 AM    Alk.  phosphatase 88 05/06/2021 12:00 AM    Bilirubin, total 0.9 05/06/2021 12:00 AM    Albumin 4.5 05/06/2021 12:00 AM    Protein, total 6.4 05/06/2021 12:00 AM    PLATELET 047 40/75/8724 05:17 AM     Lab Results   Component Value Date/Time    GFR est non- 05/06/2021 12:00 AM    GFR est  05/06/2021 12:00 AM    Creatinine 0.86 05/06/2021 12:00 AM    BUN 8 05/06/2021 12:00 AM    Sodium 141 05/06/2021 12:00 AM Potassium 3.5 05/06/2021 12:00 AM    Chloride 103 05/06/2021 12:00 AM    CO2 23 05/06/2021 12:00 AM    Magnesium 1.8 10/22/2017 05:17 AM    Phosphorus 3.6 10/22/2017 12:49 AM     ASSESSMENT and PLAN    1. Type 1DM, Poorly  controlled , on insulin pump : a1c is  9.2 %     From   Jan 2022  By POC  Compared to      8.6 %   From May 2021     Compared to    over 8.9 from nov 2020          20 years of diabetes, not in control  -         Jan 2022     Lost control   Still on medtronic 550  Pump , not downloadable   He has no meter ( uses [employer])  Basal 1.45  Units/hr;  4 am - 1.5 unit/hr   ; 730 am   1.10  Unit/hr ; 12 noon  1.3 units/hr  ; 6 pm - 1.5 units/hr ;   Carb 10 gm   Sensitivity 50    Asking him to do the upgrade         May 2021     On medtronic pump   Reviewed  The download   Decreased carb ratio from 12 gm to 10 gm   Referred him to carb counting class   I would like him to be on  CGM  I can give him GAGAN 2        Nov 2020   Pt is still on 65  G medtronic pump   He says he got a letter that said the upgrade is not covered   He did not carb at all   He does not log blood sugars on to pump   Again encouraged him to learn carbs and to document         Sept 2020   He is on 530 G medtronic pump         2. Hypoglycemia :  Educated on treating the hypoglycemia. Discussed Glucagon use and prescribed    3. HTN :  Small protein present  From may 2021  .  Patient is educated about importance of compliance with anti-hypertensives especially ARB/ACEI      4. opthal visit pending         Reviewed results with patient and discussed the labs being ordered today/bnv  Patient voiced understanding of plan of care

## 2022-01-06 LAB
ALBUMIN SERPL-MCNC: 4.7 G/DL (ref 4–5)
ALBUMIN/CREAT UR: 132 MG/G CREAT (ref 0–29)
ALBUMIN/GLOB SERPL: 1.7 {RATIO} (ref 1.2–2.2)
ALP SERPL-CCNC: 105 IU/L (ref 44–121)
ALT SERPL-CCNC: 17 IU/L (ref 0–44)
AST SERPL-CCNC: 26 IU/L (ref 0–40)
BILIRUB SERPL-MCNC: 1 MG/DL (ref 0–1.2)
BUN SERPL-MCNC: 7 MG/DL (ref 6–20)
BUN/CREAT SERPL: 9 (ref 9–20)
CALCIUM SERPL-MCNC: 9.4 MG/DL (ref 8.7–10.2)
CHLORIDE SERPL-SCNC: 103 MMOL/L (ref 96–106)
CHOLEST SERPL-MCNC: 142 MG/DL (ref 100–199)
CO2 SERPL-SCNC: 25 MMOL/L (ref 20–29)
CREAT SERPL-MCNC: 0.77 MG/DL (ref 0.76–1.27)
CREAT UR-MCNC: 415.6 MG/DL
EST. AVERAGE GLUCOSE BLD GHB EST-MCNC: 223 MG/DL
GLOBULIN SER CALC-MCNC: 2.7 G/DL (ref 1.5–4.5)
GLUCOSE SERPL-MCNC: 69 MG/DL (ref 65–99)
HBA1C MFR BLD: 9.4 % (ref 4.8–5.6)
HDLC SERPL-MCNC: 45 MG/DL
IMP & REVIEW OF LAB RESULTS: NORMAL
LDLC SERPL CALC-MCNC: 84 MG/DL (ref 0–99)
MICROALBUMIN UR-MCNC: 547.4 UG/ML
POTASSIUM SERPL-SCNC: 3.6 MMOL/L (ref 3.5–5.2)
PROT SERPL-MCNC: 7.4 G/DL (ref 6–8.5)
SODIUM SERPL-SCNC: 141 MMOL/L (ref 134–144)
TRIGL SERPL-MCNC: 65 MG/DL (ref 0–149)
VLDLC SERPL CALC-MCNC: 13 MG/DL (ref 5–40)

## 2022-02-09 ENCOUNTER — TELEPHONE (OUTPATIENT)
Dept: ENDOCRINOLOGY | Age: 32
End: 2022-02-09

## 2022-02-09 NOTE — TELEPHONE ENCOUNTER
Message received from Silvia Damico with [employer] requesting a return call in reference to patient's diabetic supplies. Returned call to [employer] and spoke with Matthew Marc. She states that patient has a pending order for infusion sets and reservoirs. She states that the order that she has is for 2 boxes of reservoirs. She inquires if a verbal order for these can be given and the patient's information be confirmed or if it would be better to just send the forma to complete. Advised to send the forms to complete and will get Dr Jossue Diehl to sign off on them. She verbalized understanding.

## 2022-03-19 PROBLEM — E10.65 TYPE 1 DIABETES MELLITUS WITH HYPERGLYCEMIA (HCC): Status: ACTIVE | Noted: 2017-10-22

## 2022-07-07 LAB
ALBUMIN SERPL-MCNC: 4.3 G/DL (ref 4–5)
ALBUMIN/CREAT UR: 29 MG/G CREAT (ref 0–29)
ALBUMIN/GLOB SERPL: 1.7 {RATIO} (ref 1.2–2.2)
ALP SERPL-CCNC: 158 IU/L (ref 44–121)
ALT SERPL-CCNC: 20 IU/L (ref 0–44)
AST SERPL-CCNC: 21 IU/L (ref 0–40)
BILIRUB SERPL-MCNC: 0.8 MG/DL (ref 0–1.2)
BUN SERPL-MCNC: 8 MG/DL (ref 6–20)
BUN/CREAT SERPL: 8 (ref 9–20)
CALCIUM SERPL-MCNC: 9 MG/DL (ref 8.7–10.2)
CHLORIDE SERPL-SCNC: 99 MMOL/L (ref 96–106)
CHOLEST SERPL-MCNC: 125 MG/DL (ref 100–199)
CO2 SERPL-SCNC: 24 MMOL/L (ref 20–29)
CREAT SERPL-MCNC: 1.01 MG/DL (ref 0.76–1.27)
CREAT UR-MCNC: 30 MG/DL
EGFR: 101 ML/MIN/1.73
EST. AVERAGE GLUCOSE BLD GHB EST-MCNC: 223 MG/DL
GLOBULIN SER CALC-MCNC: 2.6 G/DL (ref 1.5–4.5)
GLUCOSE SERPL-MCNC: 508 MG/DL (ref 65–99)
HBA1C MFR BLD: 9.4 % (ref 4.8–5.6)
HDLC SERPL-MCNC: 42 MG/DL
IMP & REVIEW OF LAB RESULTS: NORMAL
LDLC SERPL CALC-MCNC: 62 MG/DL (ref 0–99)
MICROALBUMIN UR-MCNC: 8.6 UG/ML
POTASSIUM SERPL-SCNC: 4.3 MMOL/L (ref 3.5–5.2)
PROT SERPL-MCNC: 6.9 G/DL (ref 6–8.5)
SODIUM SERPL-SCNC: 134 MMOL/L (ref 134–144)
TRIGL SERPL-MCNC: 117 MG/DL (ref 0–149)
VLDLC SERPL CALC-MCNC: 21 MG/DL (ref 5–40)

## 2022-07-13 ENCOUNTER — OFFICE VISIT (OUTPATIENT)
Dept: ENDOCRINOLOGY | Age: 32
End: 2022-07-13
Payer: COMMERCIAL

## 2022-07-13 VITALS
SYSTOLIC BLOOD PRESSURE: 119 MMHG | HEART RATE: 74 BPM | WEIGHT: 169.2 LBS | DIASTOLIC BLOOD PRESSURE: 77 MMHG | TEMPERATURE: 97.8 F | HEIGHT: 67 IN | BODY MASS INDEX: 26.56 KG/M2 | RESPIRATION RATE: 18 BRPM | OXYGEN SATURATION: 99 %

## 2022-07-13 DIAGNOSIS — Z96.41 INSULIN PUMP STATUS: ICD-10-CM

## 2022-07-13 DIAGNOSIS — Z79.4 ENCOUNTER FOR LONG-TERM (CURRENT) USE OF INSULIN (HCC): ICD-10-CM

## 2022-07-13 DIAGNOSIS — E10.65 TYPE 1 DIABETES MELLITUS WITH HYPERGLYCEMIA (HCC): Primary | ICD-10-CM

## 2022-07-13 PROCEDURE — 3046F HEMOGLOBIN A1C LEVEL >9.0%: CPT | Performed by: INTERNAL MEDICINE

## 2022-07-13 PROCEDURE — 99214 OFFICE O/P EST MOD 30 MIN: CPT | Performed by: INTERNAL MEDICINE

## 2022-07-13 RX ORDER — INSULIN LISPRO 100 [IU]/ML
INJECTION, SOLUTION INTRAVENOUS; SUBCUTANEOUS
Qty: 30 ML | Refills: 5
Start: 2022-07-13 | End: 2022-07-13 | Stop reason: SDUPTHER

## 2022-07-13 RX ORDER — INSULIN LISPRO 100 [IU]/ML
INJECTION, SOLUTION INTRAVENOUS; SUBCUTANEOUS
Qty: 30 ML | Refills: 5 | Status: SHIPPED | OUTPATIENT
Start: 2022-07-13

## 2022-07-13 NOTE — LETTER
7/17/2022    Patient: Josias Chung   YOB: 1990   Date of Visit: 7/13/2022     Morteza Barrientos MD  [address] [address]  Via Fax: [phone]    Dear Morteza Barrientos MD,      Thank you for referring Mr. Jesusita Fisher to [facility] for evaluation. My notes for this consultation are attached. If you have questions, please do not hesitate to call me. I look forward to following your patient along with you.       Sincerely,    Sintia Bustamante MD

## 2022-07-13 NOTE — PATIENT INSTRUCTIONS
SPECIFIC INSTRUCTIONS BELOW     DRINK water   Do not skip meals  Do not eat in between meals    Reduce carbs- pasta, rice, potatoes, bread   Do not drink juices or sodas, even they are calorie zero or diet drinks  Do not eat peanut butter     Do not eat sugar free cookies and cakes   Do not eat peaches, oranges, pineapples, raisins, grapes , canteloupe , honey dew and fruit medleys    ------------------------------------------------------------------------------------------------------------    humalog  -  90 units a day  ( 3 vials month ) medtronic   530 G       -----------------------------------------------------------------------------------------    Back up regimen       Check blood sugars immediately before each meal and at bedtime      Take toujeo  Or lantus  insulin  30 units  at bed time    Take  humalog lyeumjev    insulin   10  Gm  : 1 units     units before  Meals        Also, add additional humalog  as follows with meals  If blood sugars are[de-identified]    150-200 mg 1 units    201-250 mg 2 units    251-300 mg 3 units    301-350 mg 4 units    351-400 mg 5 units    401-450 mg 6 units    451-500 mg 7 units     Less than 70 mg NO INSULIN            -------------PAY ATTENTION TO THESE GENERAL INSTRUCTIONS -----------------      - The medications prescribed at this visit will not be available at pharmacy until 6 pm       - YOUR MED LIST IS NOT UP TO DATE AS SOME CHANGES ARE BEING MADE AFTER THE VISIT - FOLLOW SPECIFIC INSTRUCTIONS  ABOVE     -ANY tests other than blood work, which you opt to do  outside the  [facility] imaging facilities, you are responsible for prior authorizations if  required    - [address] UP TO DATE ON YOUR AVS- PLEASE IGNORE     Results     *Normal results will not be notified by a phone call starting January 1 2021   *If you have an upcoming visit, the results will be discussed at the visit   *Please sign up for MY CHART if you want access to your lab and test results  *Abnormal results which require immediate attention will be notified by phone call   *Abnormal results which do not require immediate assistance will be notified in 1-2 weeks       Refills    -    have your pharmacy send us a refill request . Refills are done max for one year and a visit is a must before refills are extended    Follow up appointments -  highly encourage you to make it when you are checking out. We can accommodate you into the schedule based on your clinical situation, but not for extending refills beyond a year. Labs are important to give refills and is important to get labs before the visit     Phone calls  -  Allow  24 hrs.  for non-urgent calls to be returned  Prior authorization - It may take 2-4 weeks to process  Forms  -  FMLA, DMV etc., will take up to 2 weeks to process  Cancellations - please notify the office 2 days in advance   Samples  - will only be dispensed at visits       If not showing for the appointments and cancelling appointments within 24 hours are kept track of and three  of such situations in  two consecutive years will likely be considered for termination from the practice    -------------------------------------------------------------------------------------------------------------------

## 2022-07-13 NOTE — PROGRESS NOTES
HISTORY OF PRESENT ILLNESS  Maru Claire is a 27 y.o. male. Patient here for  follow up after  initial visit of Type ONE  diabetes mellitus   From  Jan 2022       He got onto  Medtronic  Pump 770 G   He Hates medtronic  sensors       Jan 2022       He stayed home because of multiple quarantines   He did not exercise   He has no meter with him   Says he still owes money to [employer] on old meter itself         May 2021     On medtronic pump 550 G   He agrees that he has to learn carb counting     Old history :     Referred : by self/pcp  H/o diabetes for 15 years   TRANSFERRED CARE FROM DR. Marija Herman   15 years of  Diabetes   Current A1C is 8.4 %  and symptoms/problems include none   Current diabetic medications include insulin pump, medtronic  Since age 22   Current monitoring regimen: home blood tests - daily      Reviewed old labs   A1C  was in 9 to 10 % ranges       Lab Results   Component Value Date/Time    Hemoglobin A1c 9.4 (H) 07/06/2022 12:00 AM    Hemoglobin A1c 9.4 (H) 01/05/2022 12:11 PM    Hemoglobin A1c 8.6 (H) 05/06/2021 12:00 AM    Glucose 508 (>) 07/06/2022 12:00 AM    Glucose (POC) 208 (H) 10/22/2017 04:28 PM    Microalb/Creat ratio (ug/mg creat.) 29 07/06/2022 12:00 AM    LDL, calculated 62 07/06/2022 12:00 AM    Creatinine 1.01 07/06/2022 12:00 AM      Lab Results   Component Value Date/Time    Cholesterol, total 125 07/06/2022 12:00 AM    HDL Cholesterol 42 07/06/2022 12:00 AM    LDL, calculated 62 07/06/2022 12:00 AM    Triglyceride 117 07/06/2022 12:00 AM     Lab Results   Component Value Date/Time    ALT (SGPT) 20 07/06/2022 12:00 AM    Alk.  phosphatase 158 (H) 07/06/2022 12:00 AM    Bilirubin, total 0.8 07/06/2022 12:00 AM    Albumin 4.3 07/06/2022 12:00 AM    Protein, total 6.9 07/06/2022 12:00 AM    PLATELET 529 37/93/0510 05:17 AM     Lab Results   Component Value Date/Time    GFR est non- 01/05/2022 12:11 PM    GFR est  01/05/2022 12:11 PM    Creatinine 1.01 07/06/2022 12:00 AM    BUN 8 07/06/2022 12:00 AM    Sodium 134 07/06/2022 12:00 AM    Potassium 4.3 07/06/2022 12:00 AM    Chloride 99 07/06/2022 12:00 AM    CO2 24 07/06/2022 12:00 AM    Magnesium 1.8 10/22/2017 05:17 AM    Phosphorus 3.6 10/22/2017 12:49 AM     ASSESSMENT and PLAN    1. Type 1DM, Poorly  controlled , on insulin pump : a1c is  9.4 %    From  July 2022   compared to    9.4 %     From   Jan 2022  By POC  Compared to      8.6 %   From May 2021     Compared to    over 8.9 from nov 2020          20 years of diabetes, not in control  -         July 2022   NO improvement of   DM  control   Got onto medtronic 770  Pump , but he DOES not like sensors     On meter - which talks to pump ( PA pending )   Basal - slightly increased basal doses   Carb 10 gm ; Sensitivity 50        Jan 2022     Lost control   Still on medtronic 550  Pump , not downloadable   He has no meter ( uses relion)  Basal 1.45  Units/hr;  4 am - 1.5 unit/hr   ; 730 am   1.10  Unit/hr ; 12 noon  1.3 units/hr  ; 6 pm - 1.5 units/hr ;   Carb 10 gm   Sensitivity 50    Asking him to do the upgrade       May 2021     On medtronic pump   Reviewed  The download   Decreased carb ratio from 12 gm to 10 gm   Referred him to carb counting class   I would like him to be on  CGM  I can give him GAGAN 2            2. Hypoglycemia :  Educated on treating the hypoglycemia. Discussed Glucagon use and prescribed    3. HTN :  Small protein present  From may 2021      4.  opthal visit - NO retinopathy       Reviewed results with patient and discussed the labs being ordered today/bnv  Patient voiced understanding of plan of care

## 2022-07-13 NOTE — PROGRESS NOTES
1. Have you been to the ER, urgent care clinic since your last visit? No  Hospitalized since your last visit? No    2. Have you seen or consulted any other health care providers outside of the [address] [place] [address] since your last visit? Include any pap smears or colon screening.  No    Wt Readings from Last 3 Encounters:   07/13/22 169 lb 3.2 oz (76.7 kg)   01/05/22 166 lb 9.6 oz (75.6 kg)   05/11/21 169 lb 6.4 oz (76.8 kg)     Temp Readings from Last 3 Encounters:   07/13/22 97.8 °F (36.6 °C) (Temporal)   01/05/22 97.3 °F (36.3 °C) (Temporal)   05/11/21 (!) 96.5 °F (35.8 °C) (Oral)     BP Readings from Last 3 Encounters:   07/13/22 119/77   05/11/21 118/70   09/17/20 107/69     Pulse Readings from Last 3 Encounters:   07/13/22 74   01/05/22 71   05/11/21 81     Lab Results   Component Value Date/Time    Hemoglobin A1c 9.4 (H) 07/06/2022 12:00 AM    Hemoglobin A1c (POC) 9.2 01/05/2022 11:48 AM

## 2022-09-14 ENCOUNTER — TELEPHONE (OUTPATIENT)
Dept: ENDOCRINOLOGY | Age: 32
End: 2022-09-14

## 2022-09-14 DIAGNOSIS — E10.65 TYPE 1 DIABETES MELLITUS WITH HYPERGLYCEMIA (HCC): Primary | ICD-10-CM

## 2022-09-14 DIAGNOSIS — Z79.4 ENCOUNTER FOR LONG-TERM (CURRENT) USE OF INSULIN (HCC): ICD-10-CM

## 2022-09-14 NOTE — TELEPHONE ENCOUNTER
Final Anesthesia Post-op Assessment    Patient: Matt Roe  Procedure(s) Performed: COLONOSCOPY  Anesthesia type: MAC    Vitals Value Taken Time   Temp 36.4 04/26/21 1447   Pulse 73 04/26/21 1132   Resp 15 04/26/21 1132   SpO2 96 % 04/26/21 1132   /80 04/26/21 1132         Patient Location: PACU Phase 1  Post-op Vital Signs:stable  Level of Consciousness: awake and alert  Respiratory Status: spontaneous ventilation  Cardiovascular stable  Hydration: euvolemic  Pain Management: adequately controlled  Handoff: Handoff to receiving nurse was performed and questions were answered  Vomiting: none   Nausea: None  Airway Patency:patent  Post-op Assessment: no complications and patient tolerated procedure well with no complications      No complications documented.    Patient states he has a broken part to pump. Wants to know if [facility] has sent over request to replace the piece? Please call patient to discuss.

## 2022-09-14 NOTE — TELEPHONE ENCOUNTER
Pt called again and states he needs that piece for his pump as it is making his sugars run high. Please advise pt.  CECI

## 2022-09-15 RX ORDER — SYRINGE AND NEEDLE,INSULIN,1ML 25GX1"
SYRINGE, EMPTY DISPOSABLE MISCELLANEOUS
Qty: 100 EACH | Refills: 5 | Status: SHIPPED | OUTPATIENT
Start: 2022-09-15

## 2022-09-15 NOTE — TELEPHONE ENCOUNTER
Spoke with patient in reference to paperwork. Advised him that nothing has been received from [address] so if they need forms to be completed that they will need to re-fax them. Patient also requests that a prescription for syringes be sent to [facility] so he can take his insulin. Requested Prescriptions     Signed Prescriptions Disp Refills    Insulin Syringe-Needle U-100 (BD Insulin Syringe) 1 mL 25 gauge x 5/8\" syrg 100 Each 5     Sig: Use to inject insulin three times daily. Dx. Code E11.65     Authorizing Provider: Shanell Irving     Ordering User: Karan Robins     Verbal order read back to Dr Thalia Corey to send refill.

## 2022-10-20 ENCOUNTER — HOSPITAL ENCOUNTER (EMERGENCY)
Age: 32
Discharge: HOME | End: 2022-10-20
Attending: EMERGENCY MEDICINE
Payer: COMMERCIAL

## 2022-10-20 ENCOUNTER — APPOINTMENT (OUTPATIENT)
Dept: CT IMAGING | Age: 32
End: 2022-10-20
Attending: EMERGENCY MEDICINE
Payer: COMMERCIAL

## 2022-10-20 VITALS
BODY MASS INDEX: 28.16 KG/M2 | RESPIRATION RATE: 18 BRPM | HEART RATE: 74 BPM | WEIGHT: 169 LBS | SYSTOLIC BLOOD PRESSURE: 127 MMHG | HEIGHT: 65 IN | OXYGEN SATURATION: 99 % | TEMPERATURE: 97.5 F | DIASTOLIC BLOOD PRESSURE: 78 MMHG

## 2022-10-20 DIAGNOSIS — R20.2 NUMBNESS AND TINGLING IN LEFT ARM: Primary | ICD-10-CM

## 2022-10-20 DIAGNOSIS — R20.0 NUMBNESS AND TINGLING IN LEFT ARM: Primary | ICD-10-CM

## 2022-10-20 LAB
CA-I BLD-MCNC: 1.11 MMOL/L (ref 1.12–1.32)
CHLORIDE BLD-SCNC: 102 MMOL/L (ref 98–107)
CREAT BLD-MCNC: 0.82 MG/DL (ref 0.6–1.3)
GLUCOSE BLD STRIP.AUTO-MCNC: 149 MG/DL (ref 65–117)
GLUCOSE BLD-MCNC: 170 MG/DL (ref 65–100)
POTASSIUM BLD-SCNC: 3.4 MMOL/L (ref 3.5–5.1)
SERVICE CMNT-IMP: ABNORMAL
SERVICE CMNT-IMP: ABNORMAL
SODIUM BLD-SCNC: 140 MMOL/L (ref 136–145)

## 2022-10-20 PROCEDURE — 99284 EMERGENCY DEPT VISIT MOD MDM: CPT

## 2022-10-20 PROCEDURE — 72125 CT NECK SPINE W/O DYE: CPT

## 2022-10-20 NOTE — Clinical Note
[address]  [facility] EMERGENCY DEPT  [address] [id number]  [phone]    Work/School Note    Date: 10/20/2022    To Whom It May concern:      Lucia Quijano was seen and treated today in the emergency room by the following provider(s):  Attending Provider: Karla Clements MD.      Lucia Quijano is excused from work/school on 10/20/22. He is clear to return to work/school on 10/21/22.         Sincerely,          Godwin Rice MD

## 2022-10-20 NOTE — ED PROVIDER NOTES
Arm Pain        31y M with DM here with L arm burning and tingling. Started last week but went away. Seems worse if he sleeps on it a certain way. Woke up from sleep today and it seemed worse. Feels better if he hangs his arm down at his side. No other sx's like this elsewhere. Blood sugars have been in the 140-150's. No vomiting. No fever. No rash. No injury or trauma. Past Medical History:   Diagnosis Date    [facility]        History reviewed. No pertinent surgical history. Family History:   Problem Relation Age of Onset    Hypertension Father     Diabetes Maternal Grandmother        Social History     Socioeconomic History    Marital status: SINGLE     Spouse name: Not on file    Number of children: Not on file    Years of education: Not on file    Highest education level: Not on file   Occupational History    Not on file   Tobacco Use    Smoking status: Some Days    Smokeless tobacco: Never   Substance and Sexual Activity    Alcohol use: Yes     Comment: 2 x month    Drug use: No    Sexual activity: Not on file   Other Topics Concern    Not on file   Social History Narrative    Not on file     Social Determinants of Health     Financial Resource Strain: Not on file   Food Insecurity: Not on file   Transportation Needs: Not on file   Physical Activity: Not on file   Stress: Not on file   Social Connections: Not on file   Intimate Partner Violence: Not on file   Housing Stability: Not on file         ALLERGIES: Patient has no known allergies. Review of Systems  Review of Systems   Constitutional: (-) weight loss. HEENT: (-) stiff neck   Eyes: (-) discharge. Respiratory: (-) cough. Cardiovascular: (-) syncope. Gastrointestinal: (-) blood in stool. Genitourinary: (-) hematuria. Musculoskeletal: (-) myalgias. Neurological: (-) seizure. Skin: (-) petechiae  Lymph/Immunologic: (-) enlarged lymph nodes  All other systems reviewed and are negative.        Vitals:    10/20/22 1343 10/20/22 1345   BP: 127/75    Pulse: 77    Resp: 16    Temp: 97.5 °F (36.4 °C)    SpO2:  98%   Weight: 76.7 kg (169 lb)    Height: 5' 5\" (1.651 m)             Physical Exam Nursing note and vitals reviewed. Constitutional: oriented to person, place, and time. appears well-developed and well-nourished. No distress. Head: Normocephalic and atraumatic. Sclera anicteric  Nose: No rhinorrhea  Mouth/Throat: Oropharynx is clear and moist. Pharynx normal  Eyes: Conjunctivae are normal. Pupils are equal, round, and reactive to light. Right eye exhibits no discharge. Left eye exhibits no discharge. Neck: Painless normal range of motion. Neck supple. No LAD. Cardiovascular: Normal rate, regular rhythm, normal heart sounds and intact distal pulses. Exam reveals no gallop and no friction rub. No murmur heard. Pulmonary/Chest:  No respiratory distress. No wheezes. No rales. No rhonchi. No increased work of breathing. No accessory muscle use. Good air exchange throughout. Abdominal: soft, non-tender, no rebound or guarding. No hepatosplenomegaly. Normal bowel sounds throughout. Back: no tenderness to palpation, no deformities, no CVA tenderness  Extremities/Musculoskeletal: Normal range of motion. no tenderness. No edema. Distal extremities are neurovasc intact. Lymphadenopathy:   No adenopathy. Neurological:  Alert and oriented to person, place, and time. Coordination normal. CN 2-12 intact. Motor and sensory function intact. Skin: Skin is warm and dry. No rash noted. No pallor. MDM  31y M here with L arm burning/numbness. Some of it sounds positional. Will check chem8 and CT neck.        Procedures

## 2022-10-20 NOTE — ED TRIAGE NOTES
Pt arrives to ER with c/o left arm \"burning/tinging\" sensation since this morning. Denies trauma or injury. Pt reports he is a type one diabetic.

## 2022-11-21 ENCOUNTER — DOCUMENTATION ONLY (OUTPATIENT)
Dept: ENDOCRINOLOGY | Age: 32
End: 2022-11-21

## 2022-11-22 NOTE — PROGRESS NOTES
On the forms from the [employer] regarding the FMLA forms    The request is that very have to fill up the intermittent leave as once a month for 4 days    Will he wait until he sees me at the visit to go over details       Marcos Albert MD

## 2022-12-06 ENCOUNTER — OFFICE VISIT (OUTPATIENT)
Dept: ENDOCRINOLOGY | Age: 32
End: 2022-12-06
Payer: COMMERCIAL

## 2022-12-06 VITALS
OXYGEN SATURATION: 99 % | HEART RATE: 84 BPM | SYSTOLIC BLOOD PRESSURE: 114 MMHG | WEIGHT: 165 LBS | RESPIRATION RATE: 16 BRPM | DIASTOLIC BLOOD PRESSURE: 64 MMHG | BODY MASS INDEX: 27.49 KG/M2 | HEIGHT: 65 IN | TEMPERATURE: 98.3 F

## 2022-12-06 DIAGNOSIS — E10.65 TYPE 1 DIABETES MELLITUS WITH HYPERGLYCEMIA (HCC): Primary | ICD-10-CM

## 2022-12-06 DIAGNOSIS — Z79.4 ENCOUNTER FOR LONG-TERM (CURRENT) USE OF INSULIN (HCC): ICD-10-CM

## 2022-12-06 DIAGNOSIS — Z96.41 INSULIN PUMP STATUS: ICD-10-CM

## 2022-12-06 PROCEDURE — 3046F HEMOGLOBIN A1C LEVEL >9.0%: CPT | Performed by: INTERNAL MEDICINE

## 2022-12-06 PROCEDURE — 99214 OFFICE O/P EST MOD 30 MIN: CPT | Performed by: INTERNAL MEDICINE

## 2022-12-06 RX ORDER — BLOOD-GLUCOSE,RECEIVER,CONT
EACH MISCELLANEOUS
Qty: 1 EACH | Refills: 0 | Status: SHIPPED | OUTPATIENT
Start: 2022-12-06

## 2022-12-06 RX ORDER — BLOOD-GLUCOSE SENSOR
EACH MISCELLANEOUS
Qty: 1 EACH | Refills: 11 | Status: SHIPPED | OUTPATIENT
Start: 2022-12-06

## 2022-12-06 RX ORDER — INSULIN LISPRO 100 [IU]/ML
INJECTION, SOLUTION INTRAVENOUS; SUBCUTANEOUS
Qty: 30 ML | Refills: 5
Start: 2022-12-06

## 2022-12-06 RX ORDER — BLOOD-GLUCOSE TRANSMITTER
EACH MISCELLANEOUS
Qty: 1 EACH | Refills: 3 | Status: SHIPPED | OUTPATIENT
Start: 2022-12-06

## 2022-12-06 NOTE — PROGRESS NOTES
Lucia Quijano is a 34 y.o. male here for   Chief Complaint   Patient presents with    Diabetes       1. Have you been to the ER, urgent care clinic since your last visit? Hospitalized since your last visit? -[facility] a month ago numbness in arm     2. Have you seen or consulted any other health care providers outside of the [address] [place] [address] since your last visit?   Include any pap smears or colon screening.-eye doc Detail Level: Zone

## 2022-12-06 NOTE — PATIENT INSTRUCTIONS
SPECIFIC INSTRUCTIONS BELOW       DRINK water   Do not skip meals  Do not eat in between meals    Reduce carbs- pasta, rice, potatoes, bread   Try to avoid processed bread products like BISCUITS, CROISSANTS, MUFFINS    Do not drink juices or sodas, even if they are calorie zero or diet drinks and especially avoid using powders like crystalloids , UTE-AIDS     Do not eat peanut butter     Do not eat sugar free cookies and cakes   Do not eat peaches, oranges, pineapples, raisins, grapes , canteloupe , honey dew, mangoes , watermelon  and fruit medleys      ------------------------------------------------------------------------------------------------------------    humalog  -  90 units a day  ( 3 vials month ) medtronic   530 G       -----------------------------------------------------------------------------------------    Back up regimen       Check blood sugars immediately before each meal and at bedtime      Take toujeo  Or lantus  insulin  30 units  at bed time    Take  humalog lyeumjev    insulin   10  Gm  : 1 units     units before  Meals        Also, add additional humalog  as follows with meals  If blood sugars are[de-identified]    150-200 mg 1 units    201-250 mg 2 units    251-300 mg 3 units    301-350 mg 4 units    351-400 mg 5 units    401-450 mg 6 units    451-500 mg 7 units     Less than 70 mg NO INSULIN            -------------PAY ATTENTION TO THESE GENERAL INSTRUCTIONS -----------------      - The medications prescribed at this visit will not be available at pharmacy until 6 pm       - YOUR MED LIST IS NOT UP TO DATE AS SOME CHANGES ARE BEING MADE AFTER THE VISIT - FOLLOW SPECIFIC INSTRUCTIONS  ABOVE     -ANY tests other than blood work, which you opt to do  outside the  [facility] imaging facilities, you are responsible for prior authorizations if  required    - [address] UP TO DATE ON YOUR AVS- PLEASE IGNORE     Results     *Normal results will not be notified by a phone call starting January 1 2021   *If you have an upcoming visit, the results will be discussed at the visit   *Please sign up for MY CHART if you want access to your lab and test results  *Abnormal results which require immediate attention will be notified by phone call   *Abnormal results which do not require immediate assistance will be notified in 1-2 weeks       Refills    -    have your pharmacy send us a refill request . Refills are done max for one year and a visit is a must before refills are extended    Follow up appointments -  highly encourage you to make it when you are checking out. We can accommodate you into the schedule based on your clinical situation, but not for extending refills beyond a year. Labs are important to give refills and is important to get labs before the visit     Phone calls  -  Allow  24 hrs.  for non-urgent calls to be returned  Prior authorization - It may take 2-4 weeks to process  Forms  -  FMLA, DMV etc., will take up to 2 weeks to process  Cancellations - please notify the office 2 days in advance   Samples  - will only be dispensed at visits       If not showing for the appointments and cancelling appointments within 24 hours are kept track of and three  of such situations in  two consecutive years will likely be considered for termination from the practice    -------------------------------------------------------------------------------------------------------------------

## 2022-12-06 NOTE — PROGRESS NOTES
HISTORY OF PRESENT ILLNESS  Mark Smith is a 34 y.o. male. Patient here for  follow up after  last   visit of Type ONE  diabetes mellitus   From  July 2022       On medtronic pump 770 G  Uses meter   for finger sticks     July 2022     He got onto  Medtronic  Pump 770 G   He Hates medtronic  sensors       Jan 2022     He stayed home because of multiple quarantines   He did not exercise   He has no meter with him   Says he still owes money to [employer] on old meter itself     Old history :     Referred : by self/pcp  H/o diabetes for 15 years   TRANSFERRED CARE FROM DR. Destiny Vogt   15 years of  Diabetes   Current A1C is 8.4 %  and symptoms/problems include none   Current diabetic medications include insulin pump, medtronic  Since age 22   Current monitoring regimen: home blood tests - daily      Reviewed old labs   A1C  was in 9 to 10 % ranges       Lab Results   Component Value Date/Time    Hemoglobin A1c 10.0 (H) 12/01/2022 12:00 AM    Hemoglobin A1c 9.4 (H) 07/06/2022 12:00 AM    Hemoglobin A1c 9.4 (H) 01/05/2022 12:11 PM    Glucose 171 (H) 12/01/2022 12:00 AM    Glucose (POC) 170 (H) 10/20/2022 02:39 PM    Glucose (POC) 149 (H) 10/20/2022 02:02 PM    Microalb/Creat ratio (ug/mg creat.) 172 (H) 12/01/2022 12:00 AM    LDL, calculated 85 12/01/2022 12:00 AM    Creatinine (POC) 0.82 10/20/2022 02:39 PM    Creatinine 0.98 12/01/2022 12:00 AM      Lab Results   Component Value Date/Time    Cholesterol, total 143 12/01/2022 12:00 AM    HDL Cholesterol 50 12/01/2022 12:00 AM    LDL, calculated 85 12/01/2022 12:00 AM    Triglyceride 33 12/01/2022 12:00 AM     Lab Results   Component Value Date/Time    ALT (SGPT) 24 12/01/2022 12:00 AM    Alk.  phosphatase 122 (H) 12/01/2022 12:00 AM    Bilirubin, total 1.0 12/01/2022 12:00 AM    Albumin 4.7 12/01/2022 12:00 AM    Protein, total 7.1 12/01/2022 12:00 AM    PLATELET 968 87/18/3561 05:17 AM     Lab Results   Component Value Date/Time    GFR est non- 01/05/2022 12:11 PM    GFR est  01/05/2022 12:11 PM    Creatinine 0.98 12/01/2022 12:00 AM    Creatinine (POC) 0.82 10/20/2022 02:39 PM    BUN 7 12/01/2022 12:00 AM    Sodium 140 12/01/2022 12:00 AM    Sodium (POC) 140 10/20/2022 02:39 PM    Potassium 4.2 12/01/2022 12:00 AM    Potassium (POC) 3.4 (L) 10/20/2022 02:39 PM    Chloride 100 12/01/2022 12:00 AM    Chloride (POC) 102 10/20/2022 02:39 PM    CO2 25 12/01/2022 12:00 AM    Magnesium 1.8 10/22/2017 05:17 AM    Phosphorus 3.6 10/22/2017 12:49 AM     ASSESSMENT and PLAN    1. Type 1DM, Poorly  controlled , on insulin pump : a1c is 10  %   from   dec 2022   compared to  from  9.4 %    From  July 2022   compared to    9.4 %     From   Jan 2022  By POC  Compared to      8.6 %   From May 2021     Compared to    over 8.9 from nov 2020          20 years of diabetes, not in control  -   developing retinopathy  and nephropathy       Dec 2022       He has no control   He is to stop paying for medtronic sensors, to call his plan and ask for coverage for [facility] asap    Medtronic pump downloaded  -  100 % manual    Average glucose    381   TDD -  52 units a day     CARBS @ 108   Basal - 12 MN- 1,25  ; 4 - 1.6 units ; 8 -1.25 ; 12 MN 1.25; 6 pm - 1.5   Bolus 10 gm : 1 unit; sensitivity  50 mg :1 unit     CARB a day  over 400 gm   Daily use is 50 to 60 units a day     Encouraging him to go for [facility]        July 2022   NO improvement of   DM  control   Got onto medtronic 770  Pump , but he DOES not like sensors   On meter - which talks to pump ( PA pending )   Basal - slightly increased basal doses   Carb 10 gm ; Sensitivity 50      Jan 2022     Lost control   Still on medtronic 550  Pump , not downloadable   He has no meter ( uses relion)  Basal 1.45  Units/hr;  4 am - 1.5 unit/hr   ; 730 am   1.10  Unit/hr ; 12 noon  1.3 units/hr  ; 6 pm - 1.5 units/hr ;   Carb 10 gm   Sensitivity 50  Asking him to do the upgrade         2.  Hypoglycemia :  Educated on treating the hypoglycemia. Discussed Glucagon use and prescribed    3. HTN :  Small protein present  From may 2021      4. opthal visit - right side ? retinopathy       4.  Nephropathy  -  urine protein seen      Reviewed results with patient and discussed the labs being ordered today/bnv  Patient voiced understanding of plan of care

## 2023-02-10 DIAGNOSIS — Z79.4 ENCOUNTER FOR LONG-TERM (CURRENT) USE OF INSULIN (HCC): ICD-10-CM

## 2023-02-10 DIAGNOSIS — Z96.41 INSULIN PUMP STATUS: ICD-10-CM

## 2023-02-10 DIAGNOSIS — E10.65 TYPE 1 DIABETES MELLITUS WITH HYPERGLYCEMIA (HCC): ICD-10-CM

## 2023-02-10 RX ORDER — INSULIN LISPRO 100 [IU]/ML
INJECTION, SOLUTION INTRAVENOUS; SUBCUTANEOUS
Qty: 30 ML | Refills: 5 | Status: SHIPPED | OUTPATIENT
Start: 2023-02-10

## 2023-03-08 ENCOUNTER — OFFICE VISIT (OUTPATIENT)
Dept: ENDOCRINOLOGY | Age: 33
End: 2023-03-08
Payer: COMMERCIAL

## 2023-03-08 VITALS
TEMPERATURE: 97.7 F | SYSTOLIC BLOOD PRESSURE: 113 MMHG | OXYGEN SATURATION: 100 % | HEART RATE: 90 BPM | DIASTOLIC BLOOD PRESSURE: 59 MMHG | HEIGHT: 65 IN | BODY MASS INDEX: 29.02 KG/M2 | WEIGHT: 174.2 LBS

## 2023-03-08 DIAGNOSIS — E10.65 TYPE 1 DIABETES MELLITUS WITH HYPERGLYCEMIA (HCC): Primary | ICD-10-CM

## 2023-03-08 DIAGNOSIS — Z96.41 INSULIN PUMP STATUS: ICD-10-CM

## 2023-03-08 DIAGNOSIS — Z79.4 ENCOUNTER FOR LONG-TERM (CURRENT) USE OF INSULIN (HCC): ICD-10-CM

## 2023-03-08 PROCEDURE — 99215 OFFICE O/P EST HI 40 MIN: CPT | Performed by: INTERNAL MEDICINE

## 2023-03-08 PROCEDURE — 3052F HG A1C>EQUAL 8.0%<EQUAL 9.0%: CPT | Performed by: INTERNAL MEDICINE

## 2023-03-08 NOTE — PATIENT INSTRUCTIONS
SPECIFIC INSTRUCTIONS BELOW       DRINK water   Do not skip meals  Do not eat in between meals    Reduce carbs- pasta, rice, potatoes, bread   Try to avoid processed bread products like BISCUITS, CROISSANTS, MUFFINS    Do not drink juices or sodas, even if they are calorie zero or diet drinks and especially avoid using powders like crystalloids , UTE-AIDS     Do not eat peanut butter     Do not eat sugar free cookies and cakes   Do not eat peaches, oranges, pineapples, raisins, grapes , canteloupe , honey dew, mangoes , watermelon  and fruit medleys      ------------------------------------------------------------------------------------------------------------    humalog  -  90 units a day  ( 3 vials month ) medtronic   530 G       -----------------------------------------------------------------------------------------    Back up regimen       Check blood sugars immediately before each meal and at bedtime      Take toujeo  Or lantus  insulin  30 units  at bed time    Take  humalog lyeumjev    insulin   10  Gm  : 1 units     units before  Meals        Also, add additional humalog  as follows with meals  If blood sugars are[de-identified]    150-200 mg 1 units    201-250 mg 2 units    251-300 mg 3 units    301-350 mg 4 units    351-400 mg 5 units    401-450 mg 6 units    451-500 mg 7 units     Less than 70 mg NO INSULIN                -------------PAY ATTENTION TO THESE GENERAL INSTRUCTIONS -----------------      - The medications prescribed at this visit will not be available at pharmacy until 6 pm       - YOUR MED LIST IS NOT UP TO DATE AS SOME CHANGES ARE BEING MADE AFTER THE VISIT - FOLLOW SPECIFIC INSTRUCTIONS  ABOVE     -ANY tests other than blood work, which you opt to do  outside the  [facility] imaging facilities, you are responsible for prior authorizations if  required    - [address] UP TO DATE ON YOUR AVS- PLEASE IGNORE     Results     *Normal results will not be notified by a phone call starting January 1 2021   *If you have an upcoming visit, the results will be discussed at the visit   *Please sign up for MY CHART if you want access to your lab and test results  *Abnormal results which require immediate attention will be notified by phone call   *Abnormal results which do not require immediate assistance will be notified in 1-2 weeks       Refills    -    have your pharmacy send us a refill request . Refills are done max for one year and a visit is a must before refills are extended    Follow up appointments -  highly encourage you to make it when you are checking out. We can accommodate you into the schedule based on your clinical situation, but not for extending refills beyond a year. Labs are important to give refills and is important to get labs before the visit     Phone calls  -  Allow  24 hrs.  for non-urgent calls to be returned  Prior authorization - It may take 2-4 weeks to process  Forms  -  FMLA, DMV etc., will take up to 2 weeks to process  Cancellations - please notify the office 2 days in advance   Samples  - will only be dispensed at visits       If not showing for the appointments and cancelling appointments within 24 hours are kept track of and three  of such situations in  two consecutive years will likely be considered for termination from the practice    -------------------------------------------------------------------------------------------------------------------

## 2023-03-08 NOTE — LETTER
3/8/2023    Patient: Kevin Eastman   YOB: 1990   Date of Visit: 3/8/2023     Kathy Wilson MD  [facility] [address] [address]  Via Fax: [phone]    Dear Kathy Wilson MD,      Thank you for referring Mr. Elliott Edouard to [facility] for evaluation. My notes for this consultation are attached. If you have questions, please do not hesitate to call me. I look forward to following your patient along with you.       Sincerely,    Toby Torre MD

## 2023-03-08 NOTE — PROGRESS NOTES
Amaury Burk is a 34 y.o. male here for   Chief Complaint   Patient presents with    Diabetes       1. Have you been to the ER, urgent care clinic since your last visit? Hospitalized since your last visit? - No     2. Have you seen or consulted any other health care providers outside of the [address] [place] [address] since your last visit?   Include any pap smears or colon screening.- No

## 2023-03-08 NOTE — PROGRESS NOTES
Justus Mcgee MD FACE     HISTORY OF PRESENT ILLNESS  Sheldon Denson is a 34 y.o. male. Patient here for  follow up after  last   visit of Type ONE  diabetes mellitus   From  Dec  2022       Gained 9 lbs   He has done better  , feels good , proud of himself  He is  asking for [facility] paperwork   He got dexcom, but has not started it         Dec 2022     On medtronic pump 770 G  Uses meter   for finger sticks     July 2022     He got onto  Medtronic  Pump 200 G   He Hates medtronic  sensors     Old history :     Referred : by self/pcp  H/o diabetes for 15 years   TRANSFERRED CARE FROM DR. Leila Amaya   15 years of  Diabetes   Current A1C is 8.4 %  and symptoms/problems include none   Current diabetic medications include insulin pump, medtronic  Since age 22   Current monitoring regimen: home blood tests - daily      Reviewed old labs   A1C  was in 9 to 10 % ranges       Lab Results   Component Value Date/Time    Hemoglobin A1c 8.3 (H) 03/01/2023 12:00 AM    Hemoglobin A1c 10.0 (H) 12/01/2022 12:00 AM    Hemoglobin A1c 9.4 (H) 07/06/2022 12:00 AM    Glucose 154 (H) 03/01/2023 12:00 AM    Glucose (POC) 170 (H) 10/20/2022 02:39 PM    Glucose (POC) 149 (H) 10/20/2022 02:02 PM    Microalb/Creat ratio (ug/mg creat.) 54 (H) 03/01/2023 12:00 AM    LDL, calculated 81 03/01/2023 12:00 AM    Creatinine (POC) 0.82 10/20/2022 02:39 PM    Creatinine 0.96 03/01/2023 12:00 AM      Lab Results   Component Value Date/Time    Cholesterol, total 126 03/01/2023 12:00 AM    HDL Cholesterol 35 (L) 03/01/2023 12:00 AM    LDL, calculated 81 03/01/2023 12:00 AM    Triglyceride 41 03/01/2023 12:00 AM     Lab Results   Component Value Date/Time    ALT (SGPT) 17 03/01/2023 12:00 AM    Alk.  phosphatase 96 03/01/2023 12:00 AM    Bilirubin, total 1.0 03/01/2023 12:00 AM    Albumin 4.1 03/01/2023 12:00 AM    Protein, total 6.6 03/01/2023 12:00 AM    PLATELET 915 81/56/1759 05:17 AM     Lab Results   Component Value Date/Time    GFR est non- 01/05/2022 12:11 PM    GFR est  01/05/2022 12:11 PM    Creatinine 0.96 03/01/2023 12:00 AM    Creatinine (POC) 0.82 10/20/2022 02:39 PM    BUN 9 03/01/2023 12:00 AM    Sodium 137 03/01/2023 12:00 AM    Sodium (POC) 140 10/20/2022 02:39 PM    Potassium 4.1 03/01/2023 12:00 AM    Potassium (POC) 3.4 (L) 10/20/2022 02:39 PM    Chloride 100 03/01/2023 12:00 AM    Chloride (POC) 102 10/20/2022 02:39 PM    CO2 26 03/01/2023 12:00 AM    Magnesium 1.8 10/22/2017 05:17 AM    Phosphorus 3.6 10/22/2017 12:49 AM     ASSESSMENT and PLAN    1. Type 1DM, uncontrolled , on insulin pump : a1c is  8.3%   from   march 2023   compared to  10  %   from   dec 2022   compared to  from  9.4 %    From  July 2022   compared to    9.4 %     From   Jan 2022  By POC  Compared to      8.6 %   From May 2021     Compared to    over 8.9 from nov 2020          20 years of diabetes, not in control  -   developing retinopathy  and nephropathy  and he has done better march 2023 March 2023     On 770 Medtronic  pump  - warranty     Improved control , he worked hard at it   Eye visit pending     Encouraging him to at least [place] [address] pump downloaded  -  100 % manual    Average glucose    303  TDD -  52 units a day   CARBS @ 150 gm - he reduced it gladly from 400 gm     Basal - 12 MN- 1,25  ; 4 - 1.6 units ; 8 -1.25 ; 12 MN 1.25; 6 pm - 1.5   Bolus 10 gm : 1 unit; sensitivity  50 mg :1 unit            Dec 2022       He has no control   He is to stop paying for medtronic sensors, to call his plan and ask for coverage for [facility] asap    Medtronic pump downloaded  -  100 % manual    Average glucose    381   TDD -  52 units a day   CARBS @ 108   Basal - 12 MN- 1,25  ; 4 - 1.6 units ; 8 -1.25 ; 12 MN 1.25; 6 pm - 1.5   Bolus 10 gm : 1 unit; sensitivity  50 mg :1 unit   CARB a day  over 400 gm   Daily use is 50 to 60 units a day     Encouraging him to go for [facility]      2. Hypoglycemia :  Educated on treating the hypoglycemia. Discussed Glucagon use and prescribed    3. HTN :  Small protein present  From may 2021      4. opthal visit - right side ? retinopathy     5.  Nephropathy  -  urine protein seen      Reviewed the CGM and Pump downloads, discussed changes and spent more than 25 min in interpretation and counseling     Total time  : 42 min           Reviewed results with patient and discussed the labs being ordered today/bnv  Patient voiced understanding of plan of care

## 2023-03-09 ENCOUNTER — DOCUMENTATION ONLY (OUTPATIENT)
Dept: ENDOCRINOLOGY | Age: 33
End: 2023-03-09

## 2023-06-08 ENCOUNTER — OFFICE VISIT (OUTPATIENT)
Age: 33
End: 2023-06-08
Payer: COMMERCIAL

## 2023-06-08 VITALS
HEIGHT: 65 IN | WEIGHT: 167 LBS | DIASTOLIC BLOOD PRESSURE: 67 MMHG | HEART RATE: 77 BPM | SYSTOLIC BLOOD PRESSURE: 116 MMHG | BODY MASS INDEX: 27.82 KG/M2 | TEMPERATURE: 97.4 F | OXYGEN SATURATION: 98 %

## 2023-06-08 DIAGNOSIS — E10.65 TYPE 1 DIABETES MELLITUS WITH HYPERGLYCEMIA (HCC): Primary | ICD-10-CM

## 2023-06-08 DIAGNOSIS — Z96.41 PRESENCE OF INSULIN PUMP (EXTERNAL) (INTERNAL): ICD-10-CM

## 2023-06-08 DIAGNOSIS — Z79.4 LONG TERM (CURRENT) USE OF INSULIN (HCC): ICD-10-CM

## 2023-06-08 LAB — HBA1C MFR BLD: 10.1 %

## 2023-06-08 PROCEDURE — 83036 HEMOGLOBIN GLYCOSYLATED A1C: CPT | Performed by: INTERNAL MEDICINE

## 2023-06-08 PROCEDURE — 99214 OFFICE O/P EST MOD 30 MIN: CPT | Performed by: INTERNAL MEDICINE

## 2023-06-08 PROCEDURE — 3052F HG A1C>EQUAL 8.0%<EQUAL 9.0%: CPT | Performed by: INTERNAL MEDICINE

## 2023-06-08 RX ORDER — INSULIN LISPRO 100 [IU]/ML
INJECTION, SOLUTION INTRAVENOUS; SUBCUTANEOUS
Qty: 90 ML | Refills: 3 | Status: SHIPPED | OUTPATIENT
Start: 2023-06-08

## 2023-06-08 NOTE — PATIENT INSTRUCTIONS
extended    Follow up appointments -  highly encourage you to make it when you are checking out. We can accommodate you into the schedule based on your clinical situation, but not for extending refills beyond a year. Labs are important to give refills and is important to get labs before the visit     Phone calls  -  Allow  24 hrs.  for non-urgent calls to be returned  Prior authorization - It may take 2-4 weeks to process  Forms  -  FMLA, DMV etc., will take up to 2 weeks to process  Cancellations - please notify the office 2 days in advance   Samples  - will only be dispensed at visits       If not showing for the appointments and cancelling appointments within 24 hours are kept track of and three  of such situations in  two consecutive years will likely be considered for termination from the practice    -------------------------------------------------------------------------------------------------------------------

## 2023-06-08 NOTE — PROGRESS NOTES
Faizan Dobson is a 34 y.o. male here for   Chief Complaint   Patient presents with    Diabetes       1. Have you been to the ER, urgent care clinic since your last visit? Hospitalized since your last visit? - No     2. Have you seen or consulted any other health care providers outside of the [address] [place] [address] since your last visit?   Include any pap smears or colon screening.- No
compared to  10  %   from   dec 2022    compared to  from  9.4 %    From  July 2022   compared to    9.4 %     From   Jan 2022  By POC  Compared to      8.6 %   From May 2021     Compared to    over 8.9 from nov 2020              20 years of diabetes, not in control  -   developing retinopathy  and nephropathy  and he has done better march 2023 June 2023     on 770 Medtronic  pump , will see if he will like 780 G pump      Lost  control ,    Encouraging him to at least [facility] - he defers again   He will use anthony 3 samples 2 weeks prior to my visit with me      Medtronic pump downloaded  -  100 % manual     Average glucose    350   TDD -  50 units a day    CARBS @ 150 gm - he reduced it gladly from 400 gm       Basal - 12 MN- 1,25  ; 4 - 1.6 units ; 8 -1.25 ; 12 MN 1.25; 6 pm - 1.5    Bolus 10 gm : 1 unit; sensitivity  50 mg :1 unit       No blind adjustments , I said   I need the right log and understanding  of his life style   Works 11 pm to 7 pm  Sunday thru Thursday  AM- 2 setttings are needed             March 2023       On 770 Medtronic  pump  - warranty     Improved control , he worked hard at it    [employer] visit pending     Encouraging him to at least TRY  DEXCOM    Medtronic pump downloaded  -  100 % manual     Average glucose    303   TDD -  52 units a day    CARBS @ 150 gm - he reduced it gladly from 400 gm       Basal - 12 MN- 1,25  ; 4 - 1.6 units ; 8 -1.25 ; 12 MN 1.25; 6 pm - 1.5    Bolus 10 gm : 1 unit; sensitivity  50 mg :1 unit              2. Hypoglycemia :  Educated on treating the hypoglycemia. Discussed G-VOKE  use and prescribed      3. HTN :  Small protein present  From may 2021        4. opthal visit - right side ? retinopathy       5.  Nephropathy  -  urine protein seen             Reviewed results with patient and discussed the labs being ordered today/bnv   Patient voiced understanding of plan of care

## 2023-07-12 ENCOUNTER — TELEPHONE (OUTPATIENT)
Age: 33
End: 2023-07-12

## 2023-07-12 NOTE — TELEPHONE ENCOUNTER
Se from [facility] left  following up on the status of the request that was faxed over.      Fax [phone]  Phone [phone] opt 2

## 2023-07-26 ENCOUNTER — HOSPITAL ENCOUNTER (EMERGENCY)
Facility: HOSPITAL | Age: 33
Discharge: HOME | End: 2023-07-26
Attending: EMERGENCY MEDICINE
Payer: COMMERCIAL

## 2023-07-26 ENCOUNTER — APPOINTMENT (OUTPATIENT)
Facility: HOSPITAL | Age: 33
End: 2023-07-26
Payer: COMMERCIAL

## 2023-07-26 VITALS
DIASTOLIC BLOOD PRESSURE: 77 MMHG | WEIGHT: 168 LBS | TEMPERATURE: 98 F | OXYGEN SATURATION: 99 % | HEART RATE: 88 BPM | HEIGHT: 66 IN | SYSTOLIC BLOOD PRESSURE: 134 MMHG | RESPIRATION RATE: 14 BRPM | BODY MASS INDEX: 27 KG/M2

## 2023-07-26 DIAGNOSIS — S69.91XA INJURY OF RIGHT MIDDLE FINGER, INITIAL ENCOUNTER: ICD-10-CM

## 2023-07-26 DIAGNOSIS — M20.021 CENTRAL SLIP EXTENSOR TENDON INJURY (BOUTONNIERE), RIGHT: Primary | ICD-10-CM

## 2023-07-26 PROCEDURE — 99283 EMERGENCY DEPT VISIT LOW MDM: CPT

## 2023-07-26 PROCEDURE — 73140 X-RAY EXAM OF FINGER(S): CPT

## 2023-07-26 RX ORDER — IBUPROFEN 800 MG/1
800 TABLET ORAL EVERY 8 HOURS PRN
Qty: 20 TABLET | Refills: 0 | Status: SHIPPED | OUTPATIENT
Start: 2023-07-26

## 2023-07-26 ASSESSMENT — PAIN SCALES - GENERAL: PAINLEVEL_OUTOF10: 4

## 2023-07-26 ASSESSMENT — PAIN - FUNCTIONAL ASSESSMENT: PAIN_FUNCTIONAL_ASSESSMENT: 0-10

## 2023-07-26 NOTE — ED NOTES
Pt given discharge instructions, patient education, 1 prescriptions and follow-up information. Pt verbalizing understanding. All questions answered. Pt discharge to home in private vehicle, ambulatory. Pt A&Ox4, RA, pain controlled.         Lida Fernandez RN  07/26/23 [id number]

## 2023-10-12 DIAGNOSIS — E10.65 TYPE 1 DIABETES MELLITUS WITH HYPERGLYCEMIA (HCC): ICD-10-CM

## 2023-10-12 DIAGNOSIS — Z79.4 LONG TERM (CURRENT) USE OF INSULIN (HCC): ICD-10-CM

## 2023-10-12 DIAGNOSIS — Z96.41 PRESENCE OF INSULIN PUMP (EXTERNAL) (INTERNAL): ICD-10-CM

## 2023-10-12 RX ORDER — INSULIN LISPRO 100 [IU]/ML
INJECTION, SOLUTION INTRAVENOUS; SUBCUTANEOUS
Qty: 30 ML | Refills: 5 | Status: SHIPPED | OUTPATIENT
Start: 2023-10-12

## 2023-12-15 ENCOUNTER — TELEPHONE (OUTPATIENT)
Age: 33
End: 2023-12-15

## 2023-12-15 NOTE — TELEPHONE ENCOUNTER
Patient advised that forms received and signed by Dr. Ayo Conley.  Forms faxed back to [facility] with confirmation.

## 2023-12-15 NOTE — TELEPHONE ENCOUNTER
Attempted to contact patient. No answer and no voicemail set up. We received paperwork from Alireza Vida, on physician dest for signature.

## 2024-01-24 ENCOUNTER — OFFICE VISIT (OUTPATIENT)
Age: 34
End: 2024-01-24
Payer: COMMERCIAL

## 2024-01-24 VITALS
DIASTOLIC BLOOD PRESSURE: 62 MMHG | RESPIRATION RATE: 17 BRPM | SYSTOLIC BLOOD PRESSURE: 130 MMHG | BODY MASS INDEX: 26.52 KG/M2 | HEIGHT: 66 IN | WEIGHT: 165 LBS | OXYGEN SATURATION: 95 % | HEART RATE: 96 BPM

## 2024-01-24 DIAGNOSIS — Z96.41 PRESENCE OF INSULIN PUMP (EXTERNAL) (INTERNAL): ICD-10-CM

## 2024-01-24 DIAGNOSIS — Z79.4 LONG TERM (CURRENT) USE OF INSULIN (HCC): ICD-10-CM

## 2024-01-24 DIAGNOSIS — E10.65 TYPE 1 DIABETES MELLITUS WITH HYPERGLYCEMIA (HCC): Primary | ICD-10-CM

## 2024-01-24 LAB — HBA1C MFR BLD: 10.5 %

## 2024-01-24 PROCEDURE — 83036 HEMOGLOBIN GLYCOSYLATED A1C: CPT | Performed by: INTERNAL MEDICINE

## 2024-01-24 PROCEDURE — 99214 OFFICE O/P EST MOD 30 MIN: CPT | Performed by: INTERNAL MEDICINE

## 2024-01-24 NOTE — PROGRESS NOTES
Blood pressure 130/62, pulse 96, resp. rate 17, height 1.676 m (5' 6\"), weight 74.8 kg (165 lb), SpO2 95 %.

## 2024-01-24 NOTE — PROGRESS NOTES
[facility]                Delfina Chapman MD FACE       HISTORY OF PRESENT ILLNESS   Noam Mohan is a 33  y.o.  male.      Patient here for  follow up after  last   visit of Type ONE  diabetes mellitus   From  June 2023     On   medtronic 770 G   Uses relion meter   Sugars are over 250 mg   Agrees to inconsistency with diet  and use of insulin     June 2023   Weight is stable      March 2023        Gained 9 lbs    He has done better ,  feels good , proud of himself   He is  asking for [facility] paperwork    He got dexcom, but has not started it        Old history :       Referred : by self/pcp   H/o diabetes for 15 years    TRANSFERRED CARE FROM DR. FARRIS    15 years of  Diabetes    Current A1C is 8.4 %  and symptoms/problems include none    Current diabetic medications include insulin pump, medtronic  Since age 18    Current monitoring regimen: home blood tests - daily        Labs  No new labs     ASSESSMENT and PLAN     1. Type 1DM, uncontrolled , on insulin pump : a1c is  10.5 %      from    today jan 2024   compared to    10.1 %     today June 2023   compared to  8.3%   from   march 2023   compared to  10  %   from   dec 2022    compared to  from  9.4 %    From  July 2022   compared to    9.4 %     From   Jan 2022  By POC  Compared to      8.6 %   From May 2021     Compared to    over 8.9 from nov 2020         20 years of diabetes, not in control  -   developing retinopathy  and nephropathy  and he has done better march 2023 Jan 2024   No improvement in control   His sugars on meter are over 250 mg     He does not key in carb  on pump   He has to check sugars 3- 4 times a day   Downloaded medtronic 770 G  pump     He has CGM but he has not used it   He knows it all but says unable to maintain consistency         June 2023     on 770 Medtronic  pump , will see if he will like 780 G pump    Lost  control ,    Encouraging him to at least TRY  DEXCOM - he defers

## 2024-01-24 NOTE — PATIENT INSTRUCTIONS
SPECIFIC INSTRUCTIONS BELOW     ------------------------------------------------------------------------------------------------------------     humalog  -  90 units a day  ( 3 vials month ) medtronic   770  G         -----------------------------------------------------------------------------------------     Back up regimen         Check blood sugars immediately before each meal and at bedtime        Take toujeo  Or lantus  insulin  30 units  at bed time     Take  humalog lyeumjev    insulin   10  Gm  : 1 units     units before  Meals          Also, add additional humalog  as follows with meals  If blood sugars are::     150-200 mg 1 units     201-250 mg 2 units     251-300 mg 3 units     301-350 mg 4 units     351-400 mg 5 units     401-450 mg 6 units     451-500 mg 7 units     Less than 70 mg NO INSULIN                       -------------PAY ATTENTION TO THESE GENERAL INSTRUCTIONS -----------------      - The medications prescribed at this visit will not be available at pharmacy until 6 pm       - YOUR MED LIST IS NOT UP TO DATE AS SOME CHANGES ARE BEING MADE AFTER THE VISIT - FOLLOW SPECIFIC INSTRUCTIONS  ABOVE     -ANY tests other than blood work, which you opt to do  outside the  [facility] imaging facilities, you are responsible for prior authorizations if  required    - HEALTH MAINTENANCE IS NOT GOING TO BE UP TO DATE ON YOUR AVS- PLEASE IGNORE     Results     *Normal results will not be notified by a phone call starting January 1 2021   *If you have an upcoming visit, the results will be discussed at the visit   *Please sign up for MY CHART if you want access to your lab and test results  *Abnormal results which require immediate attention will be notified by phone call   *Abnormal results which do not require immediate assistance will be notified in 1-2 weeks       Refills    -    have your pharmacy send us a refill request . Refills are done max for one year and a visit is a must before refills are

## 2024-03-29 ENCOUNTER — TELEPHONE (OUTPATIENT)
Age: 34
End: 2024-03-29

## 2024-03-29 NOTE — TELEPHONE ENCOUNTER
Patient called would like to know if you can put the same information on his [place] paperwork from last year for this year as well if possible. 5 days verses 4. Thank you

## 2024-04-03 NOTE — TELEPHONE ENCOUNTER
Front office advised patient picked up completed forms on 4/2/2024. Also advised per patient request forms were faxed.

## 2024-07-18 DIAGNOSIS — E10.65 TYPE 1 DIABETES MELLITUS WITH HYPERGLYCEMIA (HCC): Primary | ICD-10-CM

## 2024-07-18 DIAGNOSIS — Z79.4 LONG TERM (CURRENT) USE OF INSULIN (HCC): ICD-10-CM

## 2024-09-26 ENCOUNTER — LAB (OUTPATIENT)
Age: 34
End: 2024-09-26

## 2024-09-26 DIAGNOSIS — Z96.41 PRESENCE OF INSULIN PUMP (EXTERNAL) (INTERNAL): ICD-10-CM

## 2024-09-26 DIAGNOSIS — E10.65 TYPE 1 DIABETES MELLITUS WITH HYPERGLYCEMIA (HCC): ICD-10-CM

## 2024-09-26 DIAGNOSIS — Z79.4 LONG TERM (CURRENT) USE OF INSULIN (HCC): ICD-10-CM

## 2024-09-27 LAB
ALBUMIN SERPL-MCNC: 4.3 G/DL (ref 4.1–5.1)
ALBUMIN/CREAT UR: 161 MG/G CREAT (ref 0–29)
ALP SERPL-CCNC: 140 IU/L (ref 44–121)
ALT SERPL-CCNC: 26 IU/L (ref 0–44)
AST SERPL-CCNC: 37 IU/L (ref 0–40)
BILIRUB SERPL-MCNC: 0.8 MG/DL (ref 0–1.2)
BUN SERPL-MCNC: 9 MG/DL (ref 6–20)
BUN/CREAT SERPL: 9 (ref 9–20)
CALCIUM SERPL-MCNC: 9.3 MG/DL (ref 8.7–10.2)
CHLORIDE SERPL-SCNC: 101 MMOL/L (ref 96–106)
CHOLEST SERPL-MCNC: 131 MG/DL (ref 100–199)
CO2 SERPL-SCNC: 22 MMOL/L (ref 20–29)
CREAT SERPL-MCNC: 1 MG/DL (ref 0.76–1.27)
CREAT UR-MCNC: 85.1 MG/DL
EGFRCR SERPLBLD CKD-EPI 2021: 101 ML/MIN/1.73
GLOBULIN SER CALC-MCNC: 2.8 G/DL (ref 1.5–4.5)
GLUCOSE SERPL-MCNC: 121 MG/DL (ref 70–99)
HBA1C MFR BLD: 10.4 % (ref 4.8–5.6)
HDLC SERPL-MCNC: 43 MG/DL
LDLC SERPL CALC-MCNC: 77 MG/DL (ref 0–99)
MICROALBUMIN UR-MCNC: 136.9 UG/ML
POTASSIUM SERPL-SCNC: 4.1 MMOL/L (ref 3.5–5.2)
PROT SERPL-MCNC: 7.1 G/DL (ref 6–8.5)
SODIUM SERPL-SCNC: 138 MMOL/L (ref 134–144)
TRIGL SERPL-MCNC: 47 MG/DL (ref 0–149)
VLDLC SERPL CALC-MCNC: 11 MG/DL (ref 5–40)

## 2024-09-28 LAB
IMP & REVIEW OF LAB RESULTS: NORMAL
Lab: NORMAL

## 2024-09-30 ENCOUNTER — OFFICE VISIT (OUTPATIENT)
Age: 34
End: 2024-09-30
Payer: COMMERCIAL

## 2024-09-30 VITALS
DIASTOLIC BLOOD PRESSURE: 76 MMHG | HEIGHT: 66 IN | SYSTOLIC BLOOD PRESSURE: 114 MMHG | BODY MASS INDEX: 25.81 KG/M2 | OXYGEN SATURATION: 96 % | TEMPERATURE: 99.5 F | HEART RATE: 86 BPM | WEIGHT: 160.6 LBS

## 2024-09-30 DIAGNOSIS — Z79.4 LONG TERM (CURRENT) USE OF INSULIN (HCC): ICD-10-CM

## 2024-09-30 DIAGNOSIS — E10.65 TYPE 1 DIABETES MELLITUS WITH HYPERGLYCEMIA (HCC): ICD-10-CM

## 2024-09-30 DIAGNOSIS — Z96.41 PRESENCE OF INSULIN PUMP (EXTERNAL) (INTERNAL): ICD-10-CM

## 2024-09-30 PROCEDURE — 3046F HEMOGLOBIN A1C LEVEL >9.0%: CPT | Performed by: INTERNAL MEDICINE

## 2024-09-30 PROCEDURE — 99214 OFFICE O/P EST MOD 30 MIN: CPT | Performed by: INTERNAL MEDICINE

## 2024-09-30 RX ORDER — GLUCAGON INJECTION, SOLUTION 1 MG/.2ML
INJECTION, SOLUTION SUBCUTANEOUS
Qty: 0.2 ML | Refills: 3 | Status: SHIPPED | OUTPATIENT
Start: 2024-09-30

## 2024-09-30 RX ORDER — INSULIN LISPRO 100 [IU]/ML
INJECTION, SOLUTION INTRAVENOUS; SUBCUTANEOUS
Qty: 30 ML | Refills: 5 | Status: SHIPPED | OUTPATIENT
Start: 2024-09-30

## 2024-09-30 RX ORDER — INSULIN GLARGINE 100 [IU]/ML
INJECTION, SOLUTION SUBCUTANEOUS
Qty: 15 ML | Refills: 5 | Status: SHIPPED | OUTPATIENT
Start: 2024-09-30

## 2024-09-30 NOTE — PATIENT INSTRUCTIONS
SPECIFIC INSTRUCTIONS BELOW     ------------------------------------------------------------------------------------------------------------     humalog  -  90 units a day  ( 3 vials month ) medtronic   770  G         -----------------------------------------------------------------------------------------     Back up regimen         Check blood sugars immediately before each meal and at bedtime        Take toujeo  Or lantus  insulin  30 units  at bed time     Take  humalog lyeumjev    insulin   10  Gm  : 1 units     units before  Meals          Also, add additional humalog  as follows with meals  If blood sugars are::     150-200 mg 1 units     201-250 mg 2 units     251-300 mg 3 units     301-350 mg 4 units     351-400 mg 5 units     401-450 mg 6 units     451-500 mg 7 units     Less than 70 mg NO INSULIN                       -------------PAY ATTENTION TO THESE GENERAL INSTRUCTIONS -----------------      - The medications prescribed at this visit will not be available at pharmacy until 6 pm       - YOUR MED LIST IS NOT UP TO DATE AS SOME CHANGES ARE BEING MADE AFTER THE VISIT - FOLLOW SPECIFIC INSTRUCTIONS  ABOVE     -ANY tests other than blood work, which you opt to do  outside the  [facility] imaging facilities, you are responsible for prior authorizations if  required    - HEALTH MAINTENANCE IS NOT GOING TO BE UP TO DATE ON YOUR AVS- PLEASE IGNORE     Results     *Normal results will not be notified by a phone call starting January 1 2021   *If you have an upcoming visit, the results will be discussed at the visit   *Please sign up for MY CHART if you want access to your lab and test results  *Abnormal results which require immediate attention will be notified by phone call   *Abnormal results which do not require immediate assistance will be notified in 1-2 weeks       Refills    -    have your pharmacy send us a refill request . Refills are done max for one year and a visit is a must before refills are

## 2024-09-30 NOTE — PROGRESS NOTES
Noam Mohan is a 34 y.o. male here for   Chief Complaint   Patient presents with    Diabetes       1. Have you been to the ER, urgent care clinic since your last visit?  Hospitalized since your last visit? -No    2. Have you seen or consulted any other health care providers outside of the [facility] since your last visit?  Include any pap smears or colon screening.-No      /76 (Site: Left Upper Arm, Position: Sitting, Cuff Size: Large Adult)   Pulse 86   Temp 99.5 °F (37.5 °C) (Temporal)   Ht 1.676 m (5' 6\")   Wt 72.8 kg (160 lb 9.6 oz)   SpO2 96%   BMI 25.92 kg/m²         
not used it   He knows it all but says unable to maintain consistency     Medtronic pump downloaded  -  100 % manual     Average glucose    380   TDD -  40 units a day    CARBS @ 30 gm    per meal       Basal - 12 MN- 1,25  ; 4 - 1.6 units ; 8 -1.25 ; 12 MN 1.25; 6 pm - 1.5    Bolus 10 gm : 1 unit; sensitivity  50 mg :1 unit       Jan 2024   No improvement in control   His sugars on meter are over 250 mg     He does not key in carb  on pump   He has to check sugars 3- 4 times a day   Downloaded medtronic 770 G  pump   He has CGM but he has not used it   He knows it all but says unable to maintain consistency              2. Hypoglycemia :  Educated on treating the hypoglycemia. Discussed G-VOKE  use and prescribed        3. HTN :  Small protein present  From may 2021          4. opthal visit - right side ? retinopathy         5. Nephropathy  -  urine protein seen                 Reviewed results with patient and discussed the labs being ordered today/bnv   Patient voiced understanding of plan of care

## 2024-10-03 DIAGNOSIS — E10.65 TYPE 1 DIABETES MELLITUS WITH HYPERGLYCEMIA (HCC): Primary | ICD-10-CM

## 2024-10-04 RX ORDER — DASIGLUCAGON 0.6 MG/.6ML
INJECTION, SOLUTION SUBCUTANEOUS
Qty: 0.6 ML | Refills: 1 | Status: SHIPPED | OUTPATIENT
Start: 2024-10-04

## 2024-10-17 ENCOUNTER — TELEPHONE (OUTPATIENT)
Age: 34
End: 2024-10-17

## 2024-10-17 NOTE — TELEPHONE ENCOUNTER
Read my last visit notes,   He said he knows it all and he got off of it     He has to get it and use it   His plan covers it     As he is on 770 G - he was using  anthony or dexcom sensors     He can upgrade himself to 780 G pump, which gives him guardian sensors and that will stabilize his sugars excellently with no trouble any where   Pump does it all     If he is ready we can inform  Shaniqua Chapman MD

## 2024-10-17 NOTE — TELEPHONE ENCOUNTER
Hi,       Pt says, he's been experiencing HTN, & hypotension, dizziness, and fatigue at work lately. Last night at work, pt's BG was at 350! Pt says, he felt fatigue and unstable. Pt says, his supervisor was reluctant to allow him to take an unscheduled break to get some water and something to eat. Pt says, his BG began fluctuating again last night and management became frustrated when he asked to take another break to eat something.    Pt is requesting Dr. Chapman please type a letter informing his employer, [employer], that he has health issues that require him to step away from his post when his sugar levels increases causing him to feel fatigue. Pt says, he is fearful of being reprimanded or viewed as being dishonest.     Pt says, at his last appt with Dr. MORALES. his A1C was high and he and Dr. MORALES discussed him getting on the Sensor. Pt would like to speak with her Nurse about this soon as he'd like to stay on top of his sugar levels, and also show his superiors at work his levels so they'll be more understanding and willing to let him step away to care for his health.     Please call pt to further discuss.     [phone]    Thank You.

## 2024-10-18 DIAGNOSIS — E10.65 TYPE 1 DIABETES MELLITUS WITH HYPERGLYCEMIA (HCC): Primary | ICD-10-CM

## 2024-10-18 RX ORDER — GLUCAGON 3 MG/1
POWDER NASAL
Qty: 1 EACH | Refills: 1 | Status: SHIPPED | OUTPATIENT
Start: 2024-10-18

## 2024-10-18 NOTE — TELEPHONE ENCOUNTER
Informed Balaji that he would like to move forward with the new 780G pump. Pt needs to  the letter for his job before 2pm on 10/22/24

## 2024-10-18 NOTE — TELEPHONE ENCOUNTER
I printed the letter, but  I would like to see him briefly  as  again there is a form from [employer] for fill up  and   we had trouble filling it out earlier  for missing information     Delfina Chapman MD

## 2024-10-21 ENCOUNTER — TELEPHONE (OUTPATIENT)
Age: 34
End: 2024-10-21

## 2024-10-21 NOTE — TELEPHONE ENCOUNTER
I spoke to  him and I mentioned to him the need for CGM info for 3 months -     He is getting to go for upgrade to 780 G medtronic pump     I also need  eye docs visit       Delfina Chapman MD

## 2024-10-30 NOTE — TELEPHONE ENCOUNTER
Attempted to call. Unsuccessful. Left [facility] for Noam Mohan to give us a call back at the office. A callback number was left.

## 2025-02-07 ENCOUNTER — TELEPHONE (OUTPATIENT)
Age: 35
End: 2025-02-07

## 2025-02-07 NOTE — TELEPHONE ENCOUNTER
Patient called back pump being mailed tomorrow states he needs his pump setting does not have my chart or a copy. Advised will send to nurse to call but may have to wait until Monday

## 2025-02-07 NOTE — TELEPHONE ENCOUNTER
Patient called stating pump sudhakar advised to call company     In mean time needs short insulin needles sent to [facility] on file to take insulin without pump

## 2025-03-17 ENCOUNTER — OFFICE VISIT (OUTPATIENT)
Age: 35
End: 2025-03-17

## 2025-03-17 VITALS
DIASTOLIC BLOOD PRESSURE: 63 MMHG | SYSTOLIC BLOOD PRESSURE: 136 MMHG | OXYGEN SATURATION: 98 % | RESPIRATION RATE: 18 BRPM | HEIGHT: 66 IN | WEIGHT: 175.6 LBS | HEART RATE: 80 BPM | BODY MASS INDEX: 28.22 KG/M2 | TEMPERATURE: 98.4 F

## 2025-03-17 DIAGNOSIS — Z96.41 PRESENCE OF INSULIN PUMP (EXTERNAL) (INTERNAL): ICD-10-CM

## 2025-03-17 DIAGNOSIS — R80.1 PERSISTENT PROTEINURIA: ICD-10-CM

## 2025-03-17 DIAGNOSIS — E10.65 TYPE 1 DIABETES MELLITUS WITH HYPERGLYCEMIA (HCC): Primary | ICD-10-CM

## 2025-03-17 DIAGNOSIS — Z79.4 LONG TERM (CURRENT) USE OF INSULIN (HCC): ICD-10-CM

## 2025-03-17 PROCEDURE — 99214 OFFICE O/P EST MOD 30 MIN: CPT | Performed by: INTERNAL MEDICINE

## 2025-03-17 PROCEDURE — 3052F HG A1C>EQUAL 8.0%<EQUAL 9.0%: CPT | Performed by: INTERNAL MEDICINE

## 2025-03-17 RX ORDER — INSULIN GLARGINE 100 [IU]/ML
INJECTION, SOLUTION SUBCUTANEOUS
Qty: 15 ML | Refills: 5 | Status: SHIPPED | OUTPATIENT
Start: 2025-03-17

## 2025-03-17 RX ORDER — LOSARTAN POTASSIUM 25 MG/1
25 TABLET ORAL DAILY
Qty: 90 TABLET | Refills: 1 | Status: SHIPPED | OUTPATIENT
Start: 2025-03-17

## 2025-03-17 RX ORDER — INSULIN LISPRO 100 [IU]/ML
INJECTION, SOLUTION INTRAVENOUS; SUBCUTANEOUS
Qty: 30 ML | Refills: 5 | Status: SHIPPED | OUTPATIENT
Start: 2025-03-17

## 2025-03-17 NOTE — PATIENT INSTRUCTIONS
SPECIFIC INSTRUCTIONS BELOW     ------------------------------------------------------------------------------------------------------------     humalog  -  90 units a day  ( 3 vials month ) medtronic   770  G         -----------------------------------------------------------------------------------------     Back up regimen         Check blood sugars immediately before each meal and at bedtime        Take toujeo  Or lantus  insulin  30 units  at bed time     Take  humalog lyeumjev    insulin   10  Gm  : 1 units     units before  Meals          Also, add additional humalog  as follows with meals  If blood sugars are::     150-200 mg 1 units     201-250 mg 2 units     251-300 mg 3 units     301-350 mg 4 units     351-400 mg 5 units     401-450 mg 6 units     451-500 mg 7 units     Less than 70 mg NO INSULIN                       -------------PAY ATTENTION TO THESE GENERAL INSTRUCTIONS -----------------      - The medications prescribed at this visit will not be available at pharmacy until 6 pm       - YOUR MED LIST IS NOT UP TO DATE AS SOME CHANGES ARE BEING MADE AFTER THE VISIT - FOLLOW SPECIFIC INSTRUCTIONS  ABOVE     -ANY tests other than blood work, which you opt to do  outside the  [facility] imaging facilities, you are responsible for prior authorizations if  required    - HEALTH MAINTENANCE IS NOT GOING TO BE UP TO DATE ON YOUR AVS- PLEASE IGNORE     Results     *Normal results will not be notified by a phone call starting January 1 2021   *If you have an upcoming visit, the results will be discussed at the visit   *Please sign up for MY CHART if you want access to your lab and test results  *Abnormal results which require immediate attention will be notified by phone call   *Abnormal results which do not require immediate assistance will be notified in 1-2 weeks       Refills    -    have your pharmacy send us a refill request . Refills are done max for one year and a visit is a must before refills are

## 2025-03-17 NOTE — PROGRESS NOTES
Noam Mohan is a 34 y.o. male here for   Chief Complaint   Patient presents with    Diabetes       1. Have you been to the ER, urgent care clinic since your last visit?  Hospitalized since your last visit? - no    2. Have you seen or consulted any other health care providers outside of the [facility] since your last visit?  Include any pap smears or colon screening.- no

## 2025-03-17 NOTE — PROGRESS NOTES
[facility] DI[facility] ENDOCRINOLOGY                Delfina Chapman MD FACE       HISTORY OF PRESENT ILLNESS   Noam Mohan is a 34   y.o.  male.      Patient here for  follow up after  last   visit of Type ONE  diabetes mellitus   From    Sept 2024    On   medtronic 770 G   Uses relion meter         Gained 15 lbs         Sept 2024      Not gotten the upgrade to 780   He is busy with custody      Jan 2024     Sugars are over 250 mg   Agrees to inconsistency with diet  and use of insulin     June 2023   Weight is stable      March 2023        Gained 9 lbs    He has done better ,  feels good , proud of himself   He is  asking for FMLA paperwork    He got dexcom, but has not started it        Old history :       Referred : by self/pcp   H/o diabetes for 15 years    TRANSFERRED CARE FROM DR. FARRIS    15 years of  Diabetes    Current A1C is 8.4 %  and symptoms/problems include none    Current diabetic medications include insulin pump, medtronic  Since age 18    Current monitoring regimen: home blood tests - daily            Labs    Diabetes    Lab Results   Component Value Date    LABA1C 10.4 (H) 09/26/2024    LABA1C 8.3 (H) 03/01/2023    LABA1C 10.0 (H) 12/01/2022       Lab Results   Component Value Date     09/26/2024    K 4.1 09/26/2024     09/26/2024    CO2 22 09/26/2024    BUN 9 09/26/2024    CREATININE 1.00 09/26/2024    GLUCOSE 121 (H) 09/26/2024    CALCIUM 9.3 09/26/2024    LABALBU 136.9 09/26/2024    BILITOT 0.8 09/26/2024    ALKPHOS 140 (H) 09/26/2024    AST 37 09/26/2024    ALT 26 09/26/2024    LABGLOM 101 09/26/2024    GFRAA 139 01/05/2022    AGRATIO 1.6 03/01/2023    CHOL 131 09/26/2024    TRIG 47 09/26/2024    LDL 77 09/26/2024    HDL 43 09/26/2024            ASSESSMENT and PLAN     1. Type 1DM, uncontrolled , on insulin pump : a1c is    ???     by   march 2025       compared  to    10.4 %      from  sept 2024    compared  to      from   10.5 %      from    today jan 2024

## 2025-03-18 LAB
EST. AVERAGE GLUCOSE BLD GHB EST-MCNC: 192 MG/DL
HBA1C MFR BLD: 8.3 % (ref 4–5.6)